# Patient Record
Sex: FEMALE | Race: WHITE | NOT HISPANIC OR LATINO | Employment: FULL TIME | URBAN - METROPOLITAN AREA
[De-identification: names, ages, dates, MRNs, and addresses within clinical notes are randomized per-mention and may not be internally consistent; named-entity substitution may affect disease eponyms.]

---

## 2023-09-05 ENCOUNTER — PATIENT OUTREACH (OUTPATIENT)
Dept: ONCOLOGY | Facility: CLINIC | Age: 53
End: 2023-09-05
Payer: COMMERCIAL

## 2023-09-05 ENCOUNTER — TRANSCRIBE ORDERS (OUTPATIENT)
Dept: OTHER | Age: 53
End: 2023-09-05

## 2023-09-05 DIAGNOSIS — C18.9 COLON CANCER (H): Primary | ICD-10-CM

## 2023-09-05 NOTE — PROGRESS NOTES
Oncology referral received from the pt, seeking 2nd opinion for colon cancer.      HX: 8/9/23 colonoscopy at , + high grade dysplasia, no invasive ca on specimen (also confirmed via path consult at [place]), 8/10 CT CAP possible mesenteric LN met, MRI Abd negative liver mets, s/p colectomy 8/24/23 at , + invasive sigmoid adenocarcinoma, pT3 pN1b. Pt is seeing Dr Hernadez 9/15, pt seeks 2nd op [facility].    Will offer next available GI Med Onc for 2nd opinion per pt preference.      Guy Chavira RN  Oncology Nurse Navigator  [facility]  [phone]

## 2023-09-21 NOTE — TELEPHONE ENCOUNTER
RECORDS STATUS - ALL OTHER DIAGNOSIS      Action September 20, 2023 11:17 PM    Action Taken - Called  to push images to  PACS.   - HP Report is positive, slides requested.   [facility] [employer] Tracking #: [id number]     Imaging Received  September 22, 2023 10:01 AM    Action: Images from  received and resolved to PACS.     RECORDS RECEIVED FROM:    DATE RECEIVED:    NOTES STATUS DETAILS   OFFICE NOTE from referring provider External 9/5/23: Self-Referred   OFFICE NOTE from surgeon -  9/13/23: Dr. Rafal Tinoco   DISCHARGE SUMMARY from hospital Ce-  8/24/23: [facility]   OPERATIVE REPORT -  8/24/23: COLECTOMY   8/9/23: Colonoscopy    MEDICATION LIST CE-     LABS  [facility] [employer] Tracking #: [id number]   PATHOLOGY REPORTS Report in [facility]   (slides Requested) 8/24/23: [id number] (positive)  8/9/23: [id number] (negative)   ANYTHING RELATED TO DIAGNOSIS CE-     IMAGING (NEED IMAGES & REPORT)     CT SCANS (Img req from ) 8/10/23: CT Chest Abd Pel   MRI (Img req from ) 8/15/23: MR Abd

## 2023-09-24 ENCOUNTER — HEALTH MAINTENANCE LETTER (OUTPATIENT)
Age: 53
End: 2023-09-24

## 2023-09-26 ENCOUNTER — ONCOLOGY VISIT (OUTPATIENT)
Dept: ONCOLOGY | Facility: CLINIC | Age: 53
End: 2023-09-26
Attending: INTERNAL MEDICINE
Payer: COMMERCIAL

## 2023-09-26 ENCOUNTER — PRE VISIT (OUTPATIENT)
Dept: ONCOLOGY | Facility: CLINIC | Age: 53
End: 2023-09-26
Payer: COMMERCIAL

## 2023-09-26 ENCOUNTER — LAB (OUTPATIENT)
Dept: LAB | Facility: CLINIC | Age: 53
End: 2023-09-26
Payer: COMMERCIAL

## 2023-09-26 VITALS
RESPIRATION RATE: 16 BRPM | OXYGEN SATURATION: 99 % | BODY MASS INDEX: 26.52 KG/M2 | TEMPERATURE: 97.6 F | SYSTOLIC BLOOD PRESSURE: 120 MMHG | HEART RATE: 72 BPM | HEIGHT: 66 IN | WEIGHT: 165 LBS | DIASTOLIC BLOOD PRESSURE: 76 MMHG

## 2023-09-26 DIAGNOSIS — D50.0 IRON DEFICIENCY ANEMIA DUE TO CHRONIC BLOOD LOSS: ICD-10-CM

## 2023-09-26 DIAGNOSIS — C18.7 MALIGNANT NEOPLASM OF SIGMOID COLON (H): ICD-10-CM

## 2023-09-26 DIAGNOSIS — D50.0 IRON DEFICIENCY ANEMIA DUE TO CHRONIC BLOOD LOSS: Primary | ICD-10-CM

## 2023-09-26 LAB
BASOPHILS # BLD AUTO: 0.1 10E3/UL (ref 0–0.2)
BASOPHILS NFR BLD AUTO: 1 %
CEA SERPL-MCNC: <0.6 NG/ML
EOSINOPHIL # BLD AUTO: 0.3 10E3/UL (ref 0–0.7)
EOSINOPHIL NFR BLD AUTO: 3 %
ERYTHROCYTE [DISTWIDTH] IN BLOOD BY AUTOMATED COUNT: 18.1 % (ref 10–15)
FERRITIN SERPL-MCNC: 6 NG/ML (ref 11–328)
HCT VFR BLD AUTO: 27.2 % (ref 35–47)
HGB BLD-MCNC: 8.1 G/DL (ref 11.7–15.7)
IMM GRANULOCYTES # BLD: 0 10E3/UL
IMM GRANULOCYTES NFR BLD: 1 %
IRON BINDING CAPACITY (ROCHE): 418 UG/DL (ref 240–430)
IRON SATN MFR SERPL: 5 % (ref 15–46)
IRON SERPL-MCNC: 21 UG/DL (ref 37–145)
LYMPHOCYTES # BLD AUTO: 2.3 10E3/UL (ref 0.8–5.3)
LYMPHOCYTES NFR BLD AUTO: 27 %
MCH RBC QN AUTO: 20.5 PG (ref 26.5–33)
MCHC RBC AUTO-ENTMCNC: 29.8 G/DL (ref 31.5–36.5)
MCV RBC AUTO: 69 FL (ref 78–100)
MONOCYTES # BLD AUTO: 0.6 10E3/UL (ref 0–1.3)
MONOCYTES NFR BLD AUTO: 7 %
NEUTROPHILS # BLD AUTO: 5.5 10E3/UL (ref 1.6–8.3)
NEUTROPHILS NFR BLD AUTO: 61 %
NRBC # BLD AUTO: 0 10E3/UL
NRBC BLD AUTO-RTO: 0 /100
PLATELET # BLD AUTO: 365 10E3/UL (ref 150–450)
RBC # BLD AUTO: 3.96 10E6/UL (ref 3.8–5.2)
WBC # BLD AUTO: 8.8 10E3/UL (ref 4–11)

## 2023-09-26 PROCEDURE — 82728 ASSAY OF FERRITIN: CPT

## 2023-09-26 PROCEDURE — 99205 OFFICE O/P NEW HI 60 MIN: CPT | Performed by: INTERNAL MEDICINE

## 2023-09-26 PROCEDURE — 83550 IRON BINDING TEST: CPT

## 2023-09-26 PROCEDURE — 36415 COLL VENOUS BLD VENIPUNCTURE: CPT

## 2023-09-26 PROCEDURE — 82378 CARCINOEMBRYONIC ANTIGEN: CPT

## 2023-09-26 PROCEDURE — 85025 COMPLETE CBC W/AUTO DIFF WBC: CPT

## 2023-09-26 PROCEDURE — 99213 OFFICE O/P EST LOW 20 MIN: CPT | Performed by: INTERNAL MEDICINE

## 2023-09-26 RX ORDER — EPINEPHRINE 1 MG/ML
0.3 INJECTION, SOLUTION, CONCENTRATE INTRAVENOUS EVERY 5 MIN PRN
Status: CANCELLED | OUTPATIENT
Start: 2023-09-28

## 2023-09-26 RX ORDER — ALBUTEROL SULFATE 90 UG/1
1-2 AEROSOL, METERED RESPIRATORY (INHALATION)
Status: CANCELLED
Start: 2023-09-28

## 2023-09-26 RX ORDER — HEPARIN SODIUM (PORCINE) LOCK FLUSH IV SOLN 100 UNIT/ML 100 UNIT/ML
5 SOLUTION INTRAVENOUS
Status: CANCELLED | OUTPATIENT
Start: 2023-09-28

## 2023-09-26 RX ORDER — DIPHENHYDRAMINE HYDROCHLORIDE 50 MG/ML
50 INJECTION INTRAMUSCULAR; INTRAVENOUS
Status: CANCELLED
Start: 2023-09-28

## 2023-09-26 RX ORDER — METHYLPREDNISOLONE SODIUM SUCCINATE 125 MG/2ML
125 INJECTION, POWDER, LYOPHILIZED, FOR SOLUTION INTRAMUSCULAR; INTRAVENOUS
Status: CANCELLED
Start: 2023-09-28

## 2023-09-26 RX ORDER — HEPARIN SODIUM,PORCINE 10 UNIT/ML
5-20 VIAL (ML) INTRAVENOUS DAILY PRN
Status: CANCELLED | OUTPATIENT
Start: 2023-09-28

## 2023-09-26 RX ORDER — ALBUTEROL SULFATE 0.83 MG/ML
2.5 SOLUTION RESPIRATORY (INHALATION)
Status: CANCELLED | OUTPATIENT
Start: 2023-09-28

## 2023-09-26 RX ORDER — MEPERIDINE HYDROCHLORIDE 25 MG/ML
25 INJECTION INTRAMUSCULAR; INTRAVENOUS; SUBCUTANEOUS EVERY 30 MIN PRN
Status: CANCELLED | OUTPATIENT
Start: 2023-09-28

## 2023-09-26 ASSESSMENT — PAIN SCALES - GENERAL: PAINLEVEL: NO PAIN (0)

## 2023-09-26 NOTE — PROGRESS NOTES
[facility] Hematology and Oncology Consult Note    Patient: Shaylee Jackson  MRN: [id number]  Date of Service: 09/26/2023      Reason for Visit    Chief Complaint   Patient presents with    Oncology Clinic Visit     Colon cancer - New consult         Assessment/Plan    Problem List Items Addressed This Visit          Hematologic    Iron deficiency anemia due to chronic blood loss - Primary    Relevant Orders    CBC with platelets and differential (Completed)    CEA (Completed)    Iron and iron binding capacity (Completed)    Ferritin (Completed)     Other Visit Diagnoses       Malignant neoplasm of sigmoid colon (H)        Relevant Orders    CBC with platelets and differential (Completed)    CEA (Completed)    Iron and iron binding capacity (Completed)    Ferritin (Completed)    Adult General Surg Referral          Resected stage III sigmoid colon cancer  pT3, pN1b, cM0  MMR proteins intact  No high risk features except for tumor deposit (1)  Reviewed her history in detail including colonoscopy reports, biopsy report and surgical path reports in detail.  I personally reviewed this report sent.  Results independently.    Screening colonoscopy picked up ulcerative, polypoid obstructing mass in the sigmoid colon.  Initial biopsy came back showing only high-grade dysplasia without any invasive carcinoma.  But clinically it looked invasive malignancy.  CT scan showed no evidence of any distant mets but she had intramesenteric lymph node enlargement consistent with calin metastatic disease.  Also had a couple of liver lesions which were evaluated by MRI and were found to be simple cysts.  Underwent sigmoidectomy.  Surgical path showing a 6.3 cm, moderate differentiated adenocarcinoma without any LVI or perineural invasion.  Margins negative.  No perforation.  Out of 64 lymph nodes removed,  3 were positive.  Had one tumor deposit.  Final pathological staging is pT3, pN1b.  As mentioned above no high risk features  except for tumor deposit which counts as an calin involvement if she did not have grossly positive nodes.  However her calin staging will not change.  For all practical purposes this is considered as low risk stage III.  Definitely will need adjuvant therapy.  Discussed various options.  CapeOx versus FOLFOX.  We also discussed duration of therapy.  Based on IDEA analysis, 3 months of CapeOx has been shown to be noninferior to 6 months of CapeOx.  However for FOLFOX the noninferiority of 3 months versus 6 months has not been established but the absolute benefit of doing 6 months of FOLFOX especially in low risk disease appear to be pretty small.    Somatic mutation is to proceed with 4 cycles of CAPOX.  Reviewed potential side effects and complications associated with capecitabine and oxaliplatin.  She will need a port placement.  Capecitabine dose will be 1000 mg per metered squared twice daily, 14 days on 7 days off.  She is agreeable to the plan and wants to proceed with treatment.  She is about 5 weeks out from surgery.  We will need to start treatment within the next couple of weeks.    She was also significantly iron deficient with low MCV and low hemoglobin.  We will recheck labs today.  If she is found to be significantly iron deficient I would treat her with IV iron (Venofer 300 mg x 3 at least 2 days apart)    ECOG Performance    0 - Independent    Problem List    Patient Active Problem List   Diagnosis    Iron deficiency anemia due to chronic blood loss     ______________________________________________________________________________    Staging History     Cancer Staging   No matching staging information was found for the patient.      History of presenting illness:  Shaylee Jackson is a 53-year-old female with a new diagnosis of stage III colon cancer s/p sigmoid resection who is here to discuss adjuvant systemic therapy for the same.    Screening colonoscopy on 8/9/2023 showed a fungating polypoid  ulcerated obstructing mass in the sigmoid colon located at 30 cm from the anal verge.  Biopsies were taken.  CEA was elevated at 6.4.  Biopsy came back showing high-grade dysplasia without obvious evidence of invasive cancer.  MMR proteins were intact.  CT chest abdomen pelvis done on 8/10/2023 showed circumferential thickening of the sigmoid colon wall reflecting known possible malignancy.  There were multiple conspicuous inferior mesenteric lymph nodes concerning for metastatic disease.  There were 2 indeterminate hepatic lesions measuring up to 1.1 cm.  This was evaluated with an MRI and was deemed to be simple cysts.  No evidence of any metastatic disease.  She underwent laparoscopic sigmoidectomy on 8/24/2023.  Surgical path has come back showing a 6.3 cm moderately differentiated adenocarcinoma of the sigmoid colon which invades muscularis propria into the pericolonic tissue.  No evidence of any tumor perforation.  No evidence of LVI.  No perineural invasion.  All margins were negative.  64 lymph nodes were removed autofused 3 were positive for metastatic disease.  There was 1 tumor deposit present.  Final pathological staging was pT3, pN1b.  MMR proteins intact.  She is here to discuss adjuvant therapy.    Has recovered well from surgery.    No significant past medical history.      Past History    No past medical history on file. No family history on file.   No past surgical history on file. Social History     Socioeconomic History    Marital status: Single     Spouse name: Not on file    Number of children: Not on file    Years of education: Not on file    Highest education level: Not on file   Occupational History    Not on file   Tobacco Use    Smoking status: Never    Smokeless tobacco: Never   Substance and Sexual Activity    Alcohol use: Not on file    Drug use: Not on file    Sexual activity: Not on file   Other Topics Concern    Not on file   Social History Narrative    Not on file     Social  Determinants of Health     Financial Resource Strain: Not on file   Food Insecurity: Not on file   Transportation Needs: Not on file   Physical Activity: Not on file   Stress: Not on file   Social Connections: Not on file   Interpersonal Safety: Not on file   Housing Stability: Not on file        Allergies    Allergies   Allergen Reactions    Codeine Nausea and Vomiting    Other Food Allergy      Gluten    Oxycodone-Acetaminophen Nausea       Review of Systems    Pertinent items are noted in HPI.      Physical Exam        9/26/2023     1:03 PM   Oncology Vitals   Height 168 cm   Weight 74.844 kg   BSA (m2) 1.87 m2   /76   Pulse 72   Temp 97.6  F (36.4  C)   Temp src Oral   SpO2 99 %   Pain Score 0 (None)       General: alert and cooperative  HEENT: Head: Normal, normocephalic, atraumatic.  Eye: Normal external eye, conjunctiva, lids cornea, BHASKAR.  Chest: Clear to auscultation bilaterally  Cardiac: S1, S2 normal, regular rate and rhythm  Abdomen: abdomen is soft without significant tenderness, masses, organomegaly or guarding.  Surgical scars are healing well  Extremities: atraumatic, no peripheral edema  Skin: No rashes or bruises  CNS: Alert and oriented x3, neurologic exam grossly normal.  Lymphatics: No significant palpable peripheral adenopathy      Lab Results    Recent Results (from the past 168 hour(s))   Ferritin   Result Value Ref Range    Ferritin 6 (L) 11 - 328 ng/mL   Iron and iron binding capacity   Result Value Ref Range    Iron 21 (L) 37 - 145 ug/dL    Iron Binding Capacity 418 240 - 430 ug/dL    Iron Sat Index 5 (L) 15 - 46 %   CEA   Result Value Ref Range    CEA <0.6 ng/mL   CBC with platelets and differential   Result Value Ref Range    WBC Count 8.8 4.0 - 11.0 10e3/uL    RBC Count 3.96 3.80 - 5.20 10e6/uL    Hemoglobin 8.1 (L) 11.7 - 15.7 g/dL    Hematocrit 27.2 (L) 35.0 - 47.0 %    MCV 69 (L) 78 - 100 fL    MCH 20.5 (L) 26.5 - 33.0 pg    MCHC 29.8 (L) 31.5 - 36.5 g/dL    RDW 18.1 (H) 10.0 -  15.0 %    Platelet Count 365 150 - 450 10e3/uL    % Neutrophils 61 %    % Lymphocytes 27 %    % Monocytes 7 %    % Eosinophils 3 %    % Basophils 1 %    % Immature Granulocytes 1 %    NRBCs per 100 WBC 0 <1 /100    Absolute Neutrophils 5.5 1.6 - 8.3 10e3/uL    Absolute Lymphocytes 2.3 0.8 - 5.3 10e3/uL    Absolute Monocytes 0.6 0.0 - 1.3 10e3/uL    Absolute Eosinophils 0.3 0.0 - 0.7 10e3/uL    Absolute Basophils 0.1 0.0 - 0.2 10e3/uL    Absolute Immature Granulocytes 0.0 <=0.4 10e3/uL    Absolute NRBCs 0.0 10e3/uL       Imaging Results    No results found.    A total of 60 minutes was spent today on this visit including face to face conversation with the patient, EMR review (labs, imaging studies, pathology reports and outside records), counseling and care co-ordination and documentation.    Signed by: Tuan Cohn MD

## 2023-09-26 NOTE — PROGRESS NOTES
"Oncology Rooming Note    September 26, 2023 1:02 PM   Shaylee Jackson is a 53 year old female who presents for:    Chief Complaint   Patient presents with    Oncology Clinic Visit     Colon cancer - New consult     Initial Vitals: /76 (BP Location: Right arm, Patient Position: Sitting, Cuff Size: Adult Regular)   Pulse 72   Temp 97.6  F (36.4  C) (Oral)   Resp 16   Ht 1.676 m (5' 6\")   Wt 74.8 kg (165 lb)   SpO2 99%   BMI 26.63 kg/m   Estimated body mass index is 26.63 kg/m  as calculated from the following:    Height as of this encounter: 1.676 m (5' 6\").    Weight as of this encounter: 74.8 kg (165 lb). Body surface area is 1.87 meters squared.  No Pain (0) Comment: Data Unavailable   No LMP recorded.  Allergies reviewed: Yes  Medications reviewed: Yes    Medications: Medication refills not needed today.  Pharmacy name entered into [facility]: [facility] #[id number] [place] [id number] [facility]  [address]    Clinical concerns:  New consult      Veronica Villatoro CMA              "

## 2023-09-26 NOTE — LETTER
9/26/2023         RE: Shaylee Jackson  [address] [place] [address]        Dear Colleague,    Thank you for referring your patient, Shaylee Jackson, to the [facility] [place]. Please see a copy of my visit note below.    [facility] Hematology and Oncology Consult Note    Patient: Shaylee Jackson  MRN: [id number]  Date of Service: 09/26/2023      Reason for Visit    Chief Complaint   Patient presents with     Oncology Clinic Visit     Colon cancer - New consult         Assessment/Plan    Problem List Items Addressed This Visit          Hematologic    Iron deficiency anemia due to chronic blood loss - Primary    Relevant Orders    CBC with platelets and differential (Completed)    CEA (Completed)    Iron and iron binding capacity (Completed)    Ferritin (Completed)     Other Visit Diagnoses       Malignant neoplasm of sigmoid colon (H)        Relevant Orders    CBC with platelets and differential (Completed)    CEA (Completed)    Iron and iron binding capacity (Completed)    Ferritin (Completed)    Adult General Surg Referral          Resected stage III sigmoid colon cancer  pT3, pN1b, cM0  MMR proteins intact  No high risk features except for tumor deposit (1)  Reviewed her history in detail including colonoscopy reports, biopsy report and surgical path reports in detail.  I personally reviewed this report sent.  Results independently.    Screening colonoscopy picked up ulcerative, polypoid obstructing mass in the sigmoid colon.  Initial biopsy came back showing only high-grade dysplasia without any invasive carcinoma.  But clinically it looked invasive malignancy.  CT scan showed no evidence of any distant mets but she had intramesenteric lymph node enlargement consistent with calin metastatic disease.  Also had a couple of liver lesions which were evaluated by MRI and were found to be simple cysts.  Underwent sigmoidectomy.  Surgical path showing a 6.3 cm,  moderate differentiated adenocarcinoma without any LVI or perineural invasion.  Margins negative.  No perforation.  Out of 64 lymph nodes removed,  3 were positive.  Had one tumor deposit.  Final pathological staging is pT3, pN1b.  As mentioned above no high risk features except for tumor deposit which counts as an calin involvement if she did not have grossly positive nodes.  However her calin staging will not change.  For all practical purposes this is considered as low risk stage III.  Definitely will need adjuvant therapy.  Discussed various options.  CapeOx versus FOLFOX.  We also discussed duration of therapy.  Based on IDEA analysis, 3 months of CapeOx has been shown to be noninferior to 6 months of CapeOx.  However for FOLFOX the noninferiority of 3 months versus 6 months has not been established but the absolute benefit of doing 6 months of FOLFOX especially in low risk disease appear to be pretty small.    Somatic mutation is to proceed with 4 cycles of CAPOX.  Reviewed potential side effects and complications associated with capecitabine and oxaliplatin.  She will need a port placement.  Capecitabine dose will be 1000 mg per metered squared twice daily, 14 days on 7 days off.  She is agreeable to the plan and wants to proceed with treatment.  She is about 5 weeks out from surgery.  We will need to start treatment within the next couple of weeks.    She was also significantly iron deficient with low MCV and low hemoglobin.  We will recheck labs today.  If she is found to be significantly iron deficient I would treat her with IV iron (Venofer 300 mg x 3 at least 2 days apart)    ECOG Performance    0 - Independent    Problem List    Patient Active Problem List   Diagnosis     Iron deficiency anemia due to chronic blood loss     ______________________________________________________________________________    Staging History     Cancer Staging   No matching staging information was found for the  patient.      History of presenting illness:  Shaylee Jackson is a 53-year-old female with a new diagnosis of stage III colon cancer s/p sigmoid resection who is here to discuss adjuvant systemic therapy for the same.    Screening colonoscopy on 8/9/2023 showed a fungating polypoid ulcerated obstructing mass in the sigmoid colon located at 30 cm from the anal verge.  Biopsies were taken.  CEA was elevated at 6.4.  Biopsy came back showing high-grade dysplasia without obvious evidence of invasive cancer.  MMR proteins were intact.  CT chest abdomen pelvis done on 8/10/2023 showed circumferential thickening of the sigmoid colon wall reflecting known possible malignancy.  There were multiple conspicuous inferior mesenteric lymph nodes concerning for metastatic disease.  There were 2 indeterminate hepatic lesions measuring up to 1.1 cm.  This was evaluated with an MRI and was deemed to be simple cysts.  No evidence of any metastatic disease.  She underwent laparoscopic sigmoidectomy on 8/24/2023.  Surgical path has come back showing a 6.3 cm moderately differentiated adenocarcinoma of the sigmoid colon which invades muscularis propria into the pericolonic tissue.  No evidence of any tumor perforation.  No evidence of LVI.  No perineural invasion.  All margins were negative.  64 lymph nodes were removed autofused 3 were positive for metastatic disease.  There was 1 tumor deposit present.  Final pathological staging was pT3, pN1b.  MMR proteins intact.  She is here to discuss adjuvant therapy.    Has recovered well from surgery.    No significant past medical history.      Past History    No past medical history on file. No family history on file.   No past surgical history on file. Social History     Socioeconomic History     Marital status: Single     Spouse name: Not on file     Number of children: Not on file     Years of education: Not on file     Highest education level: Not on file   Occupational History     Not  on file   Tobacco Use     Smoking status: Never     Smokeless tobacco: Never   Substance and Sexual Activity     Alcohol use: Not on file     Drug use: Not on file     Sexual activity: Not on file   Other Topics Concern     Not on file   Social History Narrative     Not on file     Social Determinants of Health     Financial Resource Strain: Not on file   Food Insecurity: Not on file   Transportation Needs: Not on file   Physical Activity: Not on file   Stress: Not on file   Social Connections: Not on file   Interpersonal Safety: Not on file   Housing Stability: Not on file        Allergies    Allergies   Allergen Reactions     Codeine Nausea and Vomiting     Other Food Allergy      Gluten     Oxycodone-Acetaminophen Nausea       Review of Systems    Pertinent items are noted in HPI.      Physical Exam        9/26/2023     1:03 PM   Oncology Vitals   Height 168 cm   Weight 74.844 kg   BSA (m2) 1.87 m2   /76   Pulse 72   Temp 97.6  F (36.4  C)   Temp src Oral   SpO2 99 %   Pain Score 0 (None)       General: alert and cooperative  HEENT: Head: Normal, normocephalic, atraumatic.  Eye: Normal external eye, conjunctiva, lids cornea, BHASKAR.  Chest: Clear to auscultation bilaterally  Cardiac: S1, S2 normal, regular rate and rhythm  Abdomen: abdomen is soft without significant tenderness, masses, organomegaly or guarding.  Surgical scars are healing well  Extremities: atraumatic, no peripheral edema  Skin: No rashes or bruises  CNS: Alert and oriented x3, neurologic exam grossly normal.  Lymphatics: No significant palpable peripheral adenopathy      Lab Results    Recent Results (from the past 168 hour(s))   Ferritin   Result Value Ref Range    Ferritin 6 (L) 11 - 328 ng/mL   Iron and iron binding capacity   Result Value Ref Range    Iron 21 (L) 37 - 145 ug/dL    Iron Binding Capacity 418 240 - 430 ug/dL    Iron Sat Index 5 (L) 15 - 46 %   CEA   Result Value Ref Range    CEA <0.6 ng/mL   CBC with platelets and  "differential   Result Value Ref Range    WBC Count 8.8 4.0 - 11.0 10e3/uL    RBC Count 3.96 3.80 - 5.20 10e6/uL    Hemoglobin 8.1 (L) 11.7 - 15.7 g/dL    Hematocrit 27.2 (L) 35.0 - 47.0 %    MCV 69 (L) 78 - 100 fL    MCH 20.5 (L) 26.5 - 33.0 pg    MCHC 29.8 (L) 31.5 - 36.5 g/dL    RDW 18.1 (H) 10.0 - 15.0 %    Platelet Count 365 150 - 450 10e3/uL    % Neutrophils 61 %    % Lymphocytes 27 %    % Monocytes 7 %    % Eosinophils 3 %    % Basophils 1 %    % Immature Granulocytes 1 %    NRBCs per 100 WBC 0 <1 /100    Absolute Neutrophils 5.5 1.6 - 8.3 10e3/uL    Absolute Lymphocytes 2.3 0.8 - 5.3 10e3/uL    Absolute Monocytes 0.6 0.0 - 1.3 10e3/uL    Absolute Eosinophils 0.3 0.0 - 0.7 10e3/uL    Absolute Basophils 0.1 0.0 - 0.2 10e3/uL    Absolute Immature Granulocytes 0.0 <=0.4 10e3/uL    Absolute NRBCs 0.0 10e3/uL       Imaging Results    No results found.    A total of 60 minutes was spent today on this visit including face to face conversation with the patient, EMR review (labs, imaging studies, pathology reports and outside records), counseling and care co-ordination and documentation.    Signed by: Tuan Cohn MD      Oncology Rooming Note    September 26, 2023 1:02 PM   Shaylee Jackson is a 53 year old female who presents for:    Chief Complaint   Patient presents with     Oncology Clinic Visit     Colon cancer - New consult     Initial Vitals: /76 (BP Location: Right arm, Patient Position: Sitting, Cuff Size: Adult Regular)   Pulse 72   Temp 97.6  F (36.4  C) (Oral)   Resp 16   Ht 1.676 m (5' 6\")   Wt 74.8 kg (165 lb)   SpO2 99%   BMI 26.63 kg/m   Estimated body mass index is 26.63 kg/m  as calculated from the following:    Height as of this encounter: 1.676 m (5' 6\").    Weight as of this encounter: 74.8 kg (165 lb). Body surface area is 1.87 meters squared.  No Pain (0) Comment: Data Unavailable   No LMP recorded.  Allergies reviewed: Yes  Medications reviewed: Yes    Medications: " Medication refills not needed today.  Pharmacy name entered into [facility]: [facility] #[id number] - [facility] [id number] AT [facility]  [address]    Clinical concerns:  New consult      Veronica Villatoro CMA                Again, thank you for allowing me to participate in the care of your patient.        Sincerely,        Tuan Cohn MD

## 2023-09-27 ENCOUNTER — TELEPHONE (OUTPATIENT)
Dept: SURGERY | Facility: CLINIC | Age: 53
End: 2023-09-27
Payer: COMMERCIAL

## 2023-09-27 DIAGNOSIS — C18.7 MALIGNANT NEOPLASM OF SIGMOID COLON (H): Primary | ICD-10-CM

## 2023-09-27 NOTE — TELEPHONE ENCOUNTER
Type of surgery: INSERTION, VASCULAR ACCESS PORT (N/A)   Location of surgery: [place] OR  Date and time of surgery: 10/04/2023  Surgeon: Jamin  Pre-Op Appt Date: pt will schedule with [facility]  Post-Op Appt Date: none   Packet sent out: Yes  Pre-cert/Authorization completed:  No  Date:

## 2023-09-28 ENCOUNTER — TELEPHONE (OUTPATIENT)
Dept: ONCOLOGY | Facility: CLINIC | Age: 53
End: 2023-09-28
Payer: COMMERCIAL

## 2023-09-28 DIAGNOSIS — C18.7 MALIGNANT NEOPLASM OF SIGMOID COLON (H): Primary | ICD-10-CM

## 2023-09-28 NOTE — TELEPHONE ENCOUNTER
PA Initiation    Medication: CAPECITABINE 500 MG PO TABS  Ref.#[id number]  Insurance Company: [employer] [place] - Phone [phone] Fax [phone]  Pharmacy Filling the Rx:    Filling Pharmacy Phone:    Filling Pharmacy Fax:    Start Date: 9/28/2023

## 2023-09-29 ENCOUNTER — TELEPHONE (OUTPATIENT)
Dept: ONCOLOGY | Facility: CLINIC | Age: 53
End: 2023-09-29
Payer: COMMERCIAL

## 2023-09-29 NOTE — TELEPHONE ENCOUNTER
Prior Authorization Approval    Medication: CAPECITABINE 500 MG PO TABS  Authorization Effective Date: 9/28/2023  Authorization Expiration Date: 9/28/2024  Approved Dose/Quantity: 112/21 days  Reference #: [id number]   Insurance Company: [employer] [place] - Phone [phone] Fax [phone]  Expected CoPay: $ 0  CoPay Card Available: No    Financial Assistance Needed: not needed  Which Pharmacy is filling the prescription: [facility] - [place], [place] - [address]  Pharmacy Notified: yes  Patient Notified: yes

## 2023-09-29 NOTE — ORAL ONC MGMT
"Oral Chemotherapy Monitoring Program    Primary Oncologist: Dr. Cohn  Primary Oncology Clinic: [facility], [place], operated by [facility]  Cancer Diagnosis: Colon Cancer    Drug: capecitabine  Start Date: 10/5/23  Expected duration of therapy: 4 cycles    Drug Interaction Assessment: There were no significant drug interactions identified upon review of medication list with chemotherapy agents.    Lab Monitoring Plan  Per treatment plan    Subjective/Objective:  Shaylee Jackson is a 53 year old female contacted by phone for an initial visit for oral chemotherapy education.         9/29/2023    11:00 AM   ORAL CHEMOTHERAPY   Assessment Type New Teach;Initial Work up   Diagnosis Code Colon Cancer   Providers Dr. Cohn   Clinic Name/Location [facility]   Drug Name Xeloda (capecitabine)   Current Schedule BID   Cycle Details 2 weeks on, 1 week off   Any new drug interactions? No   Is the dose as ordered appropriate for the patient? Yes       Vitals:  BP:   BP Readings from Last 1 Encounters:   09/26/23 120/76     Wt Readings from Last 1 Encounters:   09/26/23 74.8 kg (165 lb)     Estimated body surface area is 1.87 meters squared as calculated from the following:    Height as of 9/26/23: 1.676 m (5' 6\").    Weight as of 9/26/23: 74.8 kg (165 lb).    Labs:  No results found for NA within last 30 days.     No results found for K within last 30 days.     No results found for CA within last 30 days.     No results found for Mag within last 30 days.     No results found for Phos within last 30 days.     No results found for ALBUMIN within last 30 days.     No results found for BUN within last 30 days.     No results found for CR within last 30 days.       No results found for AST within last 30 days.     No results found for ALT within last 30 days.     No results found for BILITOTAL within last 30 days.       _  Result Component Current Result Ref Range   WBC Count 8.8 (9/26/2023) 4.0 - 11.0 10e3/uL     _  Result Component Current Result Ref Range "   Hemoglobin 8.1 (L) (9/26/2023) 11.7 - 15.7 g/dL     _  Result Component Current Result Ref Range   Platelet Count 365 (9/26/2023) 150 - 450 10e3/uL     No results found for ANC within last 30 days.         Assessment:  Patient is appropriate to start therapy.    Plan:  Basic chemotherapy teaching was reviewed with the patient including indication, start date of therapy, dose, administration, adverse effects, missed doses, food and drug interactions, monitoring, side effect management, office contact information, and safe handling. Written materials were provided and all questions answered.    Follow-Up:  We will follow-up about 1 week after she starts treatment for an initial follow-up.     Ranjan Kruse, PharmD  [facility] Pharmacist  [phone]  September 29, 2023

## 2023-10-02 ENCOUNTER — PATIENT OUTREACH (OUTPATIENT)
Dept: ONCOLOGY | Facility: CLINIC | Age: 53
End: 2023-10-02
Payer: COMMERCIAL

## 2023-10-02 DIAGNOSIS — C18.7 MALIGNANT NEOPLASM OF SIGMOID COLON (H): Primary | ICD-10-CM

## 2023-10-02 RX ORDER — CAPECITABINE 500 MG/1
1000 TABLET, FILM COATED ORAL 2 TIMES DAILY
Qty: 112 TABLET | Refills: 0 | Status: SHIPPED | OUTPATIENT
Start: 2023-10-04 | End: 2023-10-25

## 2023-10-02 RX ORDER — CAPECITABINE 500 MG/1
1000 TABLET, FILM COATED ORAL 2 TIMES DAILY
Qty: 56 TABLET | Refills: 0 | Status: SHIPPED | OUTPATIENT
Start: 2023-10-04 | End: 2023-10-02

## 2023-10-03 ENCOUNTER — ANESTHESIA EVENT (OUTPATIENT)
Dept: SURGERY | Facility: CLINIC | Age: 53
End: 2023-10-03
Payer: COMMERCIAL

## 2023-10-03 RX ORDER — LIDOCAINE/PRILOCAINE 2.5 %-2.5%
CREAM (GRAM) TOPICAL
Qty: 30 G | Refills: 1 | Status: SHIPPED | OUTPATIENT
Start: 2023-10-03

## 2023-10-03 NOTE — PROGRESS NOTES
[facility]: Cancer Care Initial Note                                    Discussion with Patient:                                                      Chemo Teach completed             Assessment:                                                      Initial  Current living arrangement:: I live alone;Other (Daughter moving home)  Informal Support system:: Family  Bed or wheelchair confined:: No  Mobility Status: Independent  Transportation means:: Regular car  Medication adherence problem (GOAL):: No  Knowledgeable about how to use meds:: Yes  Medication side effects suspected:: Yes  Referrals Placed: None  Advanced Care Plans/Directives on file:: No  Advanced Care Plan/Directive Status: Not Applicable  Patient Reported Pain?: Yes, is currently well-managed    Plan of Care Education Review:   Assessment completed with:: Patient    Current living arrangement  I live alone;Other (Daughter moving home)    Plan of Care Education   Yearly learning assessment completed?: Yes (see Education tab)  Diagnosis:: Colon Cancer  Does patient understand diagnosis?: Yes  Tx plan/regimen:: Xelox  Does patient understand treatment plan/regimen?: Yes  Preparing for treatment:: Reviewed treatment preparation information with patient (vascular access, day of chemo, visitor policy, what to bring, etc.)  Vascular access:: Port  Side effect education:: Diarrhea/Constipation;Fatigue;Infection;Lab value monitoring (anemia, neutropenia, thrombocytopenia);Mylosuppression;Mouth sores;Nausea/Vomiting;Neuropathy;Sexual health  Transportation means:: Regular car  Safety/self care at home reviewed with patient:: Yes  Informal Support system:: Family  Coping - concerns/fears reviewed with patient:: Yes  Plan of Care:: MD follow-up appointment;Treatment schedule;TYSON follow-up appointment  When to call provider:: Bleeding;Temperature >100.4F;Increased shortness of breath;Uncontrolled diarrhea/constipation;New/worsening pain;Uncontrolled  nausea/vomiting;Shaking chills  Reasons for deferring treatment reviewed with patient:: Yes    Evaluation of Learning  Patient Education Provided: Yes  Readiness:: Acceptance  Method:: Booklet/Handout;Explanation  Response:: Verbalizes understanding           Intervention/Education provided during outreach:                                                       C1D1 Xelox to start 10/5    Patient to follow up as scheduled at next appt    Signature:  Rupa Newton RN

## 2023-10-04 ENCOUNTER — APPOINTMENT (OUTPATIENT)
Dept: GENERAL RADIOLOGY | Facility: CLINIC | Age: 53
End: 2023-10-04
Attending: SURGERY
Payer: COMMERCIAL

## 2023-10-04 ENCOUNTER — HOSPITAL ENCOUNTER (OUTPATIENT)
Facility: CLINIC | Age: 53
Discharge: HOME | End: 2023-10-04
Attending: SURGERY | Admitting: SURGERY
Payer: COMMERCIAL

## 2023-10-04 ENCOUNTER — ANESTHESIA (OUTPATIENT)
Dept: SURGERY | Facility: CLINIC | Age: 53
End: 2023-10-04
Payer: COMMERCIAL

## 2023-10-04 VITALS
BODY MASS INDEX: 26.52 KG/M2 | HEIGHT: 66 IN | DIASTOLIC BLOOD PRESSURE: 60 MMHG | OXYGEN SATURATION: 100 % | HEART RATE: 80 BPM | WEIGHT: 165 LBS | SYSTOLIC BLOOD PRESSURE: 109 MMHG | RESPIRATION RATE: 16 BRPM | TEMPERATURE: 97.7 F

## 2023-10-04 PROCEDURE — 250N000013 HC RX MED GY IP 250 OP 250 PS 637: Performed by: NURSE ANESTHETIST, CERTIFIED REGISTERED

## 2023-10-04 PROCEDURE — 250N000009 HC RX 250: Performed by: SURGERY

## 2023-10-04 PROCEDURE — 250N000011 HC RX IP 250 OP 636: Performed by: SURGERY

## 2023-10-04 PROCEDURE — 250N000009 HC RX 250: Performed by: NURSE ANESTHETIST, CERTIFIED REGISTERED

## 2023-10-04 PROCEDURE — 370N000017 HC ANESTHESIA TECHNICAL FEE, PER MIN: Performed by: SURGERY

## 2023-10-04 PROCEDURE — 250N000011 HC RX IP 250 OP 636: Performed by: NURSE ANESTHETIST, CERTIFIED REGISTERED

## 2023-10-04 PROCEDURE — 360N000082 HC SURGERY LEVEL 2 W/ FLUORO, PER MIN: Performed by: SURGERY

## 2023-10-04 PROCEDURE — 999N000180 XR SURGERY CARM FLUORO LESS THAN 5 MIN: Mod: TC

## 2023-10-04 PROCEDURE — 272N000001 HC OR GENERAL SUPPLY STERILE: Performed by: SURGERY

## 2023-10-04 PROCEDURE — 710N000012 HC RECOVERY PHASE 2, PER MINUTE: Performed by: SURGERY

## 2023-10-04 PROCEDURE — C1788 PORT, INDWELLING, IMP: HCPCS | Performed by: SURGERY

## 2023-10-04 PROCEDURE — 258N000003 HC RX IP 258 OP 636: Performed by: NURSE ANESTHETIST, CERTIFIED REGISTERED

## 2023-10-04 PROCEDURE — 999N000065 XR CHEST PORT 1 VIEW

## 2023-10-04 PROCEDURE — 271N000001 HC OR GENERAL SUPPLY NON-STERILE: Performed by: SURGERY

## 2023-10-04 PROCEDURE — 36561 INSERT TUNNELED CV CATH: CPT | Performed by: SURGERY

## 2023-10-04 PROCEDURE — 999N000141 HC STATISTIC PRE-PROCEDURE NURSING ASSESSMENT: Performed by: SURGERY

## 2023-10-04 PROCEDURE — 77001 FLUOROGUIDE FOR VEIN DEVICE: CPT | Mod: 26 | Performed by: SURGERY

## 2023-10-04 DEVICE — CATH PORT MRI POWERPORT 8FR SL 1808000: Type: IMPLANTABLE DEVICE | Site: NECK | Status: FUNCTIONAL

## 2023-10-04 RX ORDER — LIDOCAINE HYDROCHLORIDE 20 MG/ML
INJECTION, SOLUTION INFILTRATION; PERINEURAL PRN
Status: DISCONTINUED | OUTPATIENT
Start: 2023-10-04 | End: 2023-10-04

## 2023-10-04 RX ORDER — HEPARIN SODIUM 1000 [USP'U]/ML
INJECTION, SOLUTION INTRAVENOUS; SUBCUTANEOUS PRN
Status: DISCONTINUED | OUTPATIENT
Start: 2023-10-04 | End: 2023-10-04 | Stop reason: HOSPADM

## 2023-10-04 RX ORDER — CEFAZOLIN SODIUM/WATER 2 G/20 ML
2 SYRINGE (ML) INTRAVENOUS SEE ADMIN INSTRUCTIONS
Status: DISCONTINUED | OUTPATIENT
Start: 2023-10-04 | End: 2023-10-04 | Stop reason: HOSPADM

## 2023-10-04 RX ORDER — CEFAZOLIN SODIUM/WATER 2 G/20 ML
2 SYRINGE (ML) INTRAVENOUS
Status: COMPLETED | OUTPATIENT
Start: 2023-10-04 | End: 2023-10-04

## 2023-10-04 RX ORDER — ONDANSETRON 2 MG/ML
4 INJECTION INTRAMUSCULAR; INTRAVENOUS EVERY 30 MIN PRN
Status: DISCONTINUED | OUTPATIENT
Start: 2023-10-04 | End: 2023-10-04 | Stop reason: HOSPADM

## 2023-10-04 RX ORDER — LIDOCAINE HYDROCHLORIDE AND EPINEPHRINE 10; 10 MG/ML; UG/ML
INJECTION, SOLUTION INFILTRATION; PERINEURAL PRN
Status: DISCONTINUED | OUTPATIENT
Start: 2023-10-04 | End: 2023-10-04 | Stop reason: HOSPADM

## 2023-10-04 RX ORDER — ONDANSETRON 4 MG/1
4 TABLET, ORALLY DISINTEGRATING ORAL EVERY 30 MIN PRN
Status: DISCONTINUED | OUTPATIENT
Start: 2023-10-04 | End: 2023-10-04 | Stop reason: HOSPADM

## 2023-10-04 RX ORDER — PROPOFOL 10 MG/ML
INJECTION, EMULSION INTRAVENOUS PRN
Status: DISCONTINUED | OUTPATIENT
Start: 2023-10-04 | End: 2023-10-04

## 2023-10-04 RX ORDER — OXYCODONE HYDROCHLORIDE 5 MG/1
5 TABLET ORAL
Status: DISCONTINUED | OUTPATIENT
Start: 2023-10-04 | End: 2023-10-04 | Stop reason: HOSPADM

## 2023-10-04 RX ORDER — ACETAMINOPHEN 325 MG/1
975 TABLET ORAL ONCE
Status: COMPLETED | OUTPATIENT
Start: 2023-10-04 | End: 2023-10-04

## 2023-10-04 RX ORDER — SODIUM CHLORIDE, SODIUM LACTATE, POTASSIUM CHLORIDE, CALCIUM CHLORIDE 600; 310; 30; 20 MG/100ML; MG/100ML; MG/100ML; MG/100ML
INJECTION, SOLUTION INTRAVENOUS CONTINUOUS
Status: DISCONTINUED | OUTPATIENT
Start: 2023-10-04 | End: 2023-10-04 | Stop reason: HOSPADM

## 2023-10-04 RX ORDER — BUPIVACAINE HYDROCHLORIDE 5 MG/ML
INJECTION, SOLUTION PERINEURAL PRN
Status: DISCONTINUED | OUTPATIENT
Start: 2023-10-04 | End: 2023-10-04 | Stop reason: HOSPADM

## 2023-10-04 RX ORDER — LIDOCAINE 40 MG/G
CREAM TOPICAL
Status: DISCONTINUED | OUTPATIENT
Start: 2023-10-04 | End: 2023-10-04 | Stop reason: HOSPADM

## 2023-10-04 RX ORDER — PROPOFOL 10 MG/ML
INJECTION, EMULSION INTRAVENOUS CONTINUOUS PRN
Status: DISCONTINUED | OUTPATIENT
Start: 2023-10-04 | End: 2023-10-04

## 2023-10-04 RX ORDER — KETAMINE HYDROCHLORIDE 10 MG/ML
INJECTION INTRAMUSCULAR; INTRAVENOUS PRN
Status: DISCONTINUED | OUTPATIENT
Start: 2023-10-04 | End: 2023-10-04

## 2023-10-04 RX ADMIN — PROPOFOL 100 MCG/KG/MIN: 10 INJECTION, EMULSION INTRAVENOUS at 14:08

## 2023-10-04 RX ADMIN — ACETAMINOPHEN 975 MG: 325 TABLET, FILM COATED ORAL at 13:45

## 2023-10-04 RX ADMIN — KETAMINE HYDROCHLORIDE 10 MG: 10 INJECTION INTRAMUSCULAR; INTRAVENOUS at 14:17

## 2023-10-04 RX ADMIN — KETAMINE HYDROCHLORIDE 10 MG: 10 INJECTION INTRAMUSCULAR; INTRAVENOUS at 14:21

## 2023-10-04 RX ADMIN — Medication 2 G: at 13:56

## 2023-10-04 RX ADMIN — KETAMINE HYDROCHLORIDE 10 MG: 10 INJECTION INTRAMUSCULAR; INTRAVENOUS at 14:24

## 2023-10-04 RX ADMIN — KETAMINE HYDROCHLORIDE 10 MG: 10 INJECTION INTRAMUSCULAR; INTRAVENOUS at 14:27

## 2023-10-04 RX ADMIN — SODIUM CHLORIDE, POTASSIUM CHLORIDE, SODIUM LACTATE AND CALCIUM CHLORIDE: 600; 310; 30; 20 INJECTION, SOLUTION INTRAVENOUS at 13:42

## 2023-10-04 RX ADMIN — LIDOCAINE HYDROCHLORIDE 0.1 ML: 10 INJECTION, SOLUTION EPIDURAL; INFILTRATION; INTRACAUDAL; PERINEURAL at 13:43

## 2023-10-04 RX ADMIN — KETAMINE HYDROCHLORIDE 10 MG: 10 INJECTION INTRAMUSCULAR; INTRAVENOUS at 14:13

## 2023-10-04 RX ADMIN — PROPOFOL 100 MG: 10 INJECTION, EMULSION INTRAVENOUS at 14:08

## 2023-10-04 RX ADMIN — PROPOFOL 50 MG: 10 INJECTION, EMULSION INTRAVENOUS at 14:32

## 2023-10-04 RX ADMIN — LIDOCAINE HYDROCHLORIDE 100 MG: 20 INJECTION, SOLUTION INFILTRATION; PERINEURAL at 14:08

## 2023-10-04 ASSESSMENT — ACTIVITIES OF DAILY LIVING (ADL)
ADLS_ACUITY_SCORE: 35
ADLS_ACUITY_SCORE: 33

## 2023-10-04 NOTE — ANESTHESIA POSTPROCEDURE EVALUATION
Patient: Shaylee Jackson    Procedure: Procedure(s):  INSERTION, VASCULAR ACCESS PORT       Anesthesia Type:  General    Note:  Disposition: Outpatient   Postop Pain Control: Uneventful            Sign Out: Well controlled pain   PONV: No   Neuro/Psych: Uneventful            Sign Out: Acceptable/Baseline neuro status   Airway/Respiratory: Uneventful            Sign Out: Acceptable/Baseline resp. status   CV/Hemodynamics: Uneventful            Sign Out: Acceptable CV status; No obvious hypovolemia; No obvious fluid overload   Other NRE: NONE   DID A NON-ROUTINE EVENT OCCUR? No           Last vitals:  Vitals Value Taken Time   /55 10/04/23 1509   Temp 36.5  C (97.7  F) 10/04/23 1509   Pulse 79 10/04/23 1509   Resp 16 10/04/23 1509   SpO2 100 % 10/04/23 1513   Vitals shown include unvalidated device data.    Electronically Signed By: Cachorro Gutierrez CRNA, APRN CRNA  October 4, 2023  3:14 PM

## 2023-10-04 NOTE — DISCHARGE INSTRUCTIONS
Discharge for Port Placement  Routine Postoperative Care  You may shower now   Extra Strength Tylenol and over the counter Motrin (if you can take these medications) should be sufficient for your painis sufficient.   Eat a balanced diet and try to maintain good nutrition. Also drink plenty of fluids. Some of the pain medications can cause constipation, and drinking plenty of water will help prevent this.  If you do get constipated, you may need a stool softener or laxative. We or your pharmacist can help you with this.  You may resume light activity as you feel up to it. Avoid any heavy lifting (heavier than 10 pounds), or strenuous activity (including exercise) for one week.    Incision Sites  After 24 hours, you may shower.  The glue will follow-off on its own  2)            After 24-48 hours you may shower, cleaning wounds with soapy water. After showering, pat the wound dry with a clean towel. Do not apply any creams or ointments                   to the incision.   3)            You should call with any signs that the wound may be infected. Signs of infection include: the skin around the wound becoming redder, swollen or feeling hot; the                 wound has green or yellow drainage or smells bad, you have fever over 101oF or increasing pain.       Follow-up  You do not require routine follow-up for your procedure.  You must have your port accessed at least once a month to be flushed.  If there are any issues with your port or you are finished with use and would like it removed, please call the office to schedule an appointment.    Remember that healing from surgery, even if that surgery seems small, takes time. If you have any questions about your procedure, your follow-up instructions or the plan of care, do not hesitate to call.  For urgent calls weekends and holidays or prior to 8 am or after 4:30 pm weekdays, call the [facility] Surgery Office ([phone]) will be forwarded to a nurse  and if necessary Dr. Neville or the on call general surgeon.  Nausea and Vomiting: Nausea and vomiting can occur any time after receiving anesthesia. If you experience nausea and vomiting we encourage you to move to a clear liquid diet and advance your diet as tolerated. If nausea and vomiting do not improve within 12 hours please call the surgeon or present to the Emergency department.     Break-through Bleeding: If your experience bleeding from your surgical site apply pressure and additional dressing per nurse instruction. For simple problems such as a saturated dressing, you may need to reinforce the dressing with more gauze and tape and put slight pressure on the site. If bleeding does not subside contact the surgeon or present to the Emergency Department.    Post-op Infection: If you develop a fever of 100.4 or greater, have pus like drainage, redness, swelling or severe pain at the surgical site not alleviated with pain medications; please contact the surgeon or present to the Emergency Department.

## 2023-10-04 NOTE — ANESTHESIA CARE TRANSFER NOTE
Patient: Shaylee Jackson    Procedure: Procedure(s):  INSERTION, VASCULAR ACCESS PORT       Diagnosis: Malignant neoplasm of sigmoid colon (H) [C18.7]  Diagnosis Additional Information: No value filed.    Anesthesia Type:   General     Note:    Oropharynx: spontaneously breathing and oropharynx clear of all foreign objects  Level of Consciousness: awake  Oxygen Supplementation: nasal cannula  Level of Supplemental Oxygen (L/min / FiO2): 2  Independent Airway: airway patency satisfactory and stable  Dentition: dentition unchanged  Vital Signs Stable: post-procedure vital signs reviewed and stable  Report to RN Given: handoff report given  Patient transferred to: Phase II    Handoff Report: Identifed the Patient, Identified the Reponsible Provider, Reviewed the pertinent medical history, Discussed the surgical course, Reviewed Intra-OP anesthesia mangement and issues during anesthesia, Set expectations for post-procedure period and Allowed opportunity for questions and acknowledgement of understanding    Vitals:  Vitals Value Taken Time   BP     Temp     Pulse     Resp     SpO2         Electronically Signed By: Cachorro Gutierrez CRNA, APRN CRNA  October 4, 2023  3:05 PM

## 2023-10-04 NOTE — ANESTHESIA PREPROCEDURE EVALUATION
Anesthesia Pre-Procedure Evaluation    Patient: Shaylee Jackson   MRN: [id number] : 1970        Procedure : Procedure(s):  INSERTION, VASCULAR ACCESS PORT          No past medical history on file.   History reviewed. No pertinent surgical history.   Allergies   Allergen Reactions    Codeine Nausea and Vomiting    Other Food Allergy      Gluten    Oxycodone-Acetaminophen Nausea      Social History     Tobacco Use    Smoking status: Never    Smokeless tobacco: Never   Substance Use Topics    Alcohol use: Not on file      Wt Readings from Last 1 Encounters:   10/04/23 74.8 kg (165 lb)        Anesthesia Evaluation   Pt has had prior anesthetic. Type: General and MAC.    History of anesthetic complications       ROS/MED HX  ENT/Pulmonary:  - neg pulmonary ROS     Neurologic:  - neg neurologic ROS     Cardiovascular:  - neg cardiovascular ROS     METS/Exercise Tolerance: >4 METS    Hematologic:     (+)      anemia,          Musculoskeletal: Comment: Lumbago      GI/Hepatic:  - neg GI/hepatic ROS     Renal/Genitourinary:  - neg Renal ROS     Endo:     (+)          thyroid problem, hypothyroidism,           Psychiatric/Substance Use:  - neg psychiatric ROS     Infectious Disease:  - neg infectious disease ROS     Malignancy:   (+) Malignancy, History of GI.GI CA  Active status post.      Other:  - neg other ROS          Physical Exam    Airway  airway exam normal      Mallampati: II   TM distance: > 3 FB   Neck ROM: full   Mouth opening: > 3 cm    Respiratory Devices and Support         Dental       (+) Modest Abnormalities - crowns, retainers, 1 or 2 missing teeth      Cardiovascular   cardiovascular exam normal       Rhythm and rate: regular and normal     Pulmonary   pulmonary exam normal        breath sounds clear to auscultation           OUTSIDE LABS:  CBC:   Lab Results   Component Value Date    WBC 8.8 2023    HGB 8.1 (L) 2023    HCT 27.2 (L) 2023     2023     BMP: No results  found for: NA, POTASSIUM, CHLORIDE, CO2, BUN, CR, GLC  COAGS: No results found for: PTT, INR, FIBR  POC: No results found for: BGM, HCG, HCGS  HEPATIC: No results found for: ALBUMIN, PROTTOTAL, ALT, AST, GGT, ALKPHOS, BILITOTAL, BILIDIRECT, ARDEN  OTHER: No results found for: PH, LACT, A1C, SLIM, PHOS, MAG, LIPASE, AMYLASE, TSH, T4, T3, CRP, SED    Anesthesia Plan    ASA Status:  2    NPO Status:  NPO Appropriate    Anesthesia Type: General.     - Airway: Native airway   Induction: Intravenous, Propofol.   Maintenance: TIVA.        Consents    Anesthesia Plan(s) and associated risks, benefits, and realistic alternatives discussed. Questions answered and patient/representative(s) expressed understanding.     - Discussed: Risks, Benefits and Alternatives for BOTH SEDATION and the PROCEDURE were discussed     - Discussed with:  Patient      - Extended Intubation/Ventilatory Support Discussed: No.      - Patient is DNR/DNI Status: No     Use of blood products discussed: No .     Postoperative Care    Pain management: Oral pain medications, IV analgesics.   PONV prophylaxis: Ondansetron (or other 5HT-3), Background Propofol Infusion     Comments:                Cachorro Gutierrez CRNA, APRN NAOMI

## 2023-10-04 NOTE — OP NOTE
"Procedure Date: 10/04/23     PREOPERATIVE DIAGNOSIS: 1. Colon cancer  POSTOPERATIVE DIAGNOSIS: Same    PROCEDURE:    1. Placement of Right internal jugular vein Infusion port (8 Arabic Bard Power-Port)  2. Fluoroscopic guidance and confirmation of port placement.  3. Ultrasound guided venous access    SURGEON: Lewis Neville DO    ASSISTANT: Mikey Gross PA-C (needed for retraction, suction, assistance with closure)    ESTIMATED BLOOD LOSS: 3 mL    INDICATIONS:Ms. Shaylee Jackson is a 53 year old white female who presents with a history of colon cancer. The patient is in need of an infusion port for chemotherapy access, and a discussion was held with the patient regarding indications, risks, benefits, and alternatives (including, but not limited to, risks of bleeding, infection, pneumothorax, need for revision, thrombosis, stenosis, port malposition/malfunction, cardiac arrhythmia, and the rare risk of gas embolism). She understood the information given, questions were addressed, and she wishes to proceed.     DESCRIPTION OF PROCEDURE: The patient was brought to the operating room and placed supine on the operating room table. Sedative agents were administered by the anesthesia service. The bilateral chest and neck were then prepped and draped in the usual sterile fashion. The patient was placed in Trendelenburg position. The right internal jugular vein was assessed, compressible and appeared suitable for placement.  The right internal jugular vein was percutaneously accessed after \"a single pass of the introducer needle under direct visualization on ultrasound. A guidewire was advanced under direct fluoroscopic guidance, and the trajectory was confirmed toward the superior vena cava/heart. Additional local anesthesia was used, and a cutdown was created on the right chest and the incision was deepened to create an appropriate pocket to allow the port reservoir to comfortably reside. An introducer sheath was " then passed over the guidewire without difficulty, under direct fluoroscopic guidance, and the dilator and guidewire removed. The infusion port catheter was then passed until the tip the catheter was in the region of the cavo-atrial junction via fluoroscopy . The sheath was torn away in the standard fashion. The catheter was tunneled to the reservoir site, manicured to an appropriate length, and secured to the infusion port reservoir and the locking collar affixed. The port was easily flushed with a heparin solution, after assuring ease of aspiration. The infusion port itself was then secured to the tissue in the base of the pocket using interrupted 0-Ethibond suture. The final catheter position was again assessed under fluoroscopy and appeared satisfactory, with no abnormal trajectory or kinks noted, and with the tip of the catheter again noted at the cavo-atrial junction. The pocket was irrigated. Hemostasis was assured. An instrument count was conducted, and included laparotomy pads, needles and sponges, and was found to be correct. The subcutaneous tissue was closed with interrupted 3-0 Vicryl. Skin edges were re-approximated using 4-0 Monocryl, and the wound was cleansed and dried prior to application of sterile glue    The patient tolerated the procedure well. She was then transferred to the recovery room in stable condition, was allowed to recover and was seated upright for a post-procedure chest x-ray.     Lewis Neville DO on 10/4/2023 at 3:02 PM

## 2023-10-04 NOTE — H&P
"Surgical Consultation/History and Physical  [facility]    Shaylee is seen in consultation for port    Chief Complaint:  Rectal cancer    HPI:  Shaylee Jackson presents in consultation today for evaluation of infusion port placement. She has a history of rectal cancer. The patient is  left hand  dominant, has never had central venous access, pneumothorax, lung surgery, history of dialysis or renal failure, and denies repetitive movements with the upper extremity (such as pitching, chopping wood, bowling, or hunting).      Patient Active Problem List   Diagnosis    Iron deficiency anemia due to chronic blood loss    Malignant neoplasm of sigmoid colon (H)     No past medical history on file.    History reviewed. No pertinent surgical history.    History reviewed. No pertinent family history.    Social History     Tobacco Use    Smoking status: Never    Smokeless tobacco: Never   Substance Use Topics    Alcohol use: Not on file      History   Drug Use Not on file     No current outpatient medications on file.     Allergies   Allergen Reactions    Codeine Nausea and Vomiting    Other Food Allergy      Gluten    Oxycodone-Acetaminophen Nausea     Review of Systems:   10 point ROS otherwise negative    Physical Exam:  /63 (BP Location: Right arm)   Pulse 71   Temp 98.2  F (36.8  C) (Oral)   Ht 1.676 m (5' 6\")   Wt 74.8 kg (165 lb)   SpO2 100%   BMI 26.63 kg/m      Constitutional- No acute distress, well nourished, non-toxic  Eyes: Anicteric, no injection.  PERRL  ENT:  Normocephalic, atraumatic, Nose midline, moist mucus membranes  Neck - supple, no LAD  Respiratory- Clear to auscultation bilaterally, good inspiratory effort  Cardiovascular - Heart RRR, no lift's, thrills, murmurs, rubs, or gallop.  No peripheral edema.  No clubbing.  Neuro - No focal neuro deficits, Alert and oriented x 3  Psych: Appropriate mood and affect  Musculoskeletal: Normal gait, symmetric strength.  FROM upper " and lower extremities.  Skin: Warm, Dry    LABS:     Lab Results   Component Value Date    WBC 8.8 09/26/2023    HGB 8.1 (L) 09/26/2023    HCT 27.2 (L) 09/26/2023    MCV 69 (L) 09/26/2023     09/26/2023      No results found for: INR, PROTIME       INFORMED CONSENT:     A discussion of the indications, risks, benefits and alternatives to surgery was held with the patient, and the risks discussed include beeding, infection, thrombosis, stenosis, port malfunction or malposition, air embolism, pneumothorax, hemothorax, or cardiac arrythmia . The patient understood the information given, questions addressed, and she wishes to proceed. She understands the need for maintenance of the infusion port at least once a month while in place.    ASSESSMENT AND PLAN:     Ms. Shaylee Jackson is in need of an infusion port for rectal cancer, and is being expedited to surgery to facilitate care. She  understood the information given, and wishes to proceed accordingly.- and is being accordingly scheduled.    Lewis Neville,  on 10/4/2023 at 1:48 PM

## 2023-10-05 ENCOUNTER — INFUSION THERAPY VISIT (OUTPATIENT)
Dept: INFUSION THERAPY | Facility: CLINIC | Age: 53
End: 2023-10-05
Attending: INTERNAL MEDICINE
Payer: COMMERCIAL

## 2023-10-05 ENCOUNTER — TELEPHONE (OUTPATIENT)
Dept: ONCOLOGY | Facility: HOSPITAL | Age: 53
End: 2023-10-05

## 2023-10-05 VITALS
SYSTOLIC BLOOD PRESSURE: 129 MMHG | DIASTOLIC BLOOD PRESSURE: 73 MMHG | WEIGHT: 163.6 LBS | TEMPERATURE: 98.1 F | BODY MASS INDEX: 26.41 KG/M2 | HEART RATE: 66 BPM

## 2023-10-05 DIAGNOSIS — C18.7 MALIGNANT NEOPLASM OF SIGMOID COLON (H): Primary | ICD-10-CM

## 2023-10-05 DIAGNOSIS — D50.0 IRON DEFICIENCY ANEMIA DUE TO CHRONIC BLOOD LOSS: Primary | ICD-10-CM

## 2023-10-05 DIAGNOSIS — C18.7 MALIGNANT NEOPLASM OF SIGMOID COLON (H): ICD-10-CM

## 2023-10-05 LAB
ALBUMIN SERPL BCG-MCNC: 4.1 G/DL (ref 3.5–5.2)
ALP SERPL-CCNC: 47 U/L (ref 35–104)
ALT SERPL W P-5'-P-CCNC: 7 U/L (ref 0–50)
ANION GAP SERPL CALCULATED.3IONS-SCNC: 10 MMOL/L (ref 7–15)
AST SERPL W P-5'-P-CCNC: 13 U/L (ref 0–45)
BASO+EOS+MONOS # BLD AUTO: ABNORMAL 10*3/UL
BASO+EOS+MONOS NFR BLD AUTO: ABNORMAL %
BASOPHILS # BLD AUTO: 0.1 10E3/UL (ref 0–0.2)
BASOPHILS NFR BLD AUTO: 1 %
BILIRUB SERPL-MCNC: 0.3 MG/DL
BUN SERPL-MCNC: 8.7 MG/DL (ref 6–20)
CALCIUM SERPL-MCNC: 9.5 MG/DL (ref 8.6–10)
CHLORIDE SERPL-SCNC: 104 MMOL/L (ref 98–107)
CREAT SERPL-MCNC: 0.78 MG/DL (ref 0.51–0.95)
DEPRECATED HCO3 PLAS-SCNC: 26 MMOL/L (ref 22–29)
EGFRCR SERPLBLD CKD-EPI 2021: >90 ML/MIN/1.73M2
EOSINOPHIL # BLD AUTO: 0.2 10E3/UL (ref 0–0.7)
EOSINOPHIL NFR BLD AUTO: 2 %
ERYTHROCYTE [DISTWIDTH] IN BLOOD BY AUTOMATED COUNT: 18.6 % (ref 10–15)
GLUCOSE SERPL-MCNC: 99 MG/DL (ref 70–99)
HCT VFR BLD AUTO: 25.5 % (ref 35–47)
HGB BLD-MCNC: 7.6 G/DL (ref 11.7–15.7)
IMM GRANULOCYTES # BLD: 0 10E3/UL
IMM GRANULOCYTES NFR BLD: 0 %
LYMPHOCYTES # BLD AUTO: 2.1 10E3/UL (ref 0.8–5.3)
LYMPHOCYTES NFR BLD AUTO: 23 %
MCH RBC QN AUTO: 20.7 PG (ref 26.5–33)
MCHC RBC AUTO-ENTMCNC: 29.8 G/DL (ref 31.5–36.5)
MCV RBC AUTO: 70 FL (ref 78–100)
MONOCYTES # BLD AUTO: 0.6 10E3/UL (ref 0–1.3)
MONOCYTES NFR BLD AUTO: 7 %
NEUTROPHILS # BLD AUTO: 6.1 10E3/UL (ref 1.6–8.3)
NEUTROPHILS NFR BLD AUTO: 67 %
NRBC # BLD AUTO: 0 10E3/UL
NRBC BLD AUTO-RTO: 0 /100
PLATELET # BLD AUTO: 427 10E3/UL (ref 150–450)
POTASSIUM SERPL-SCNC: 3.8 MMOL/L (ref 3.4–5.3)
PROT SERPL-MCNC: 7.3 G/DL (ref 6.4–8.3)
RBC # BLD AUTO: 3.67 10E6/UL (ref 3.8–5.2)
SODIUM SERPL-SCNC: 140 MMOL/L (ref 135–145)
WBC # BLD AUTO: 9 10E3/UL (ref 4–11)

## 2023-10-05 PROCEDURE — 96415 CHEMO IV INFUSION ADDL HR: CPT

## 2023-10-05 PROCEDURE — 96367 TX/PROPH/DG ADDL SEQ IV INF: CPT

## 2023-10-05 PROCEDURE — 36591 DRAW BLOOD OFF VENOUS DEVICE: CPT | Performed by: INTERNAL MEDICINE

## 2023-10-05 PROCEDURE — 96375 TX/PRO/DX INJ NEW DRUG ADDON: CPT

## 2023-10-05 PROCEDURE — 96413 CHEMO IV INFUSION 1 HR: CPT

## 2023-10-05 PROCEDURE — 85025 COMPLETE CBC W/AUTO DIFF WBC: CPT | Performed by: INTERNAL MEDICINE

## 2023-10-05 PROCEDURE — 80053 COMPREHEN METABOLIC PANEL: CPT | Performed by: INTERNAL MEDICINE

## 2023-10-05 PROCEDURE — 250N000011 HC RX IP 250 OP 636: Performed by: INTERNAL MEDICINE

## 2023-10-05 PROCEDURE — 258N000003 HC RX IP 258 OP 636: Performed by: INTERNAL MEDICINE

## 2023-10-05 RX ORDER — DIPHENHYDRAMINE HYDROCHLORIDE 50 MG/ML
50 INJECTION INTRAMUSCULAR; INTRAVENOUS
Status: CANCELLED
Start: 2023-10-07

## 2023-10-05 RX ORDER — METHYLPREDNISOLONE SODIUM SUCCINATE 125 MG/2ML
125 INJECTION, POWDER, LYOPHILIZED, FOR SOLUTION INTRAMUSCULAR; INTRAVENOUS
Status: CANCELLED
Start: 2023-10-05

## 2023-10-05 RX ORDER — HEPARIN SODIUM,PORCINE 10 UNIT/ML
5-20 VIAL (ML) INTRAVENOUS DAILY PRN
Status: CANCELLED | OUTPATIENT
Start: 2023-10-05

## 2023-10-05 RX ORDER — HEPARIN SODIUM (PORCINE) LOCK FLUSH IV SOLN 100 UNIT/ML 100 UNIT/ML
5 SOLUTION INTRAVENOUS
Status: CANCELLED | OUTPATIENT
Start: 2023-10-05

## 2023-10-05 RX ORDER — LORAZEPAM 2 MG/ML
0.5 INJECTION INTRAMUSCULAR EVERY 4 HOURS PRN
Status: CANCELLED | OUTPATIENT
Start: 2023-10-05

## 2023-10-05 RX ORDER — MEPERIDINE HYDROCHLORIDE 25 MG/ML
25 INJECTION INTRAMUSCULAR; INTRAVENOUS; SUBCUTANEOUS EVERY 30 MIN PRN
Status: CANCELLED | OUTPATIENT
Start: 2023-10-05

## 2023-10-05 RX ORDER — EPINEPHRINE 1 MG/ML
0.3 INJECTION, SOLUTION, CONCENTRATE INTRAVENOUS EVERY 5 MIN PRN
Status: CANCELLED | OUTPATIENT
Start: 2023-10-07

## 2023-10-05 RX ORDER — ALBUTEROL SULFATE 0.83 MG/ML
2.5 SOLUTION RESPIRATORY (INHALATION)
Status: CANCELLED | OUTPATIENT
Start: 2023-10-05

## 2023-10-05 RX ORDER — ONDANSETRON 8 MG/1
8 TABLET, FILM COATED ORAL EVERY 8 HOURS PRN
Qty: 30 TABLET | Refills: 2 | Status: SHIPPED | OUTPATIENT
Start: 2023-10-05 | End: 2023-10-27

## 2023-10-05 RX ORDER — METHYLPREDNISOLONE SODIUM SUCCINATE 125 MG/2ML
125 INJECTION, POWDER, LYOPHILIZED, FOR SOLUTION INTRAMUSCULAR; INTRAVENOUS
Status: CANCELLED
Start: 2023-10-07

## 2023-10-05 RX ORDER — ONDANSETRON 2 MG/ML
8 INJECTION INTRAMUSCULAR; INTRAVENOUS ONCE
Status: CANCELLED | OUTPATIENT
Start: 2023-10-05

## 2023-10-05 RX ORDER — ALBUTEROL SULFATE 90 UG/1
1-2 AEROSOL, METERED RESPIRATORY (INHALATION)
Status: CANCELLED
Start: 2023-10-07

## 2023-10-05 RX ORDER — PROCHLORPERAZINE MALEATE 10 MG
10 TABLET ORAL EVERY 6 HOURS PRN
Qty: 30 TABLET | Refills: 2 | Status: SHIPPED | OUTPATIENT
Start: 2023-10-05 | End: 2024-02-22

## 2023-10-05 RX ORDER — ALBUTEROL SULFATE 90 UG/1
1-2 AEROSOL, METERED RESPIRATORY (INHALATION)
Status: CANCELLED
Start: 2023-10-05

## 2023-10-05 RX ORDER — ONDANSETRON 2 MG/ML
8 INJECTION INTRAMUSCULAR; INTRAVENOUS ONCE
Status: COMPLETED | OUTPATIENT
Start: 2023-10-05 | End: 2023-10-05

## 2023-10-05 RX ORDER — DIPHENHYDRAMINE HYDROCHLORIDE 50 MG/ML
50 INJECTION INTRAMUSCULAR; INTRAVENOUS
Status: CANCELLED
Start: 2023-10-05

## 2023-10-05 RX ORDER — ALBUTEROL SULFATE 0.83 MG/ML
2.5 SOLUTION RESPIRATORY (INHALATION)
Status: CANCELLED | OUTPATIENT
Start: 2023-10-07

## 2023-10-05 RX ORDER — EPINEPHRINE 1 MG/ML
0.3 INJECTION, SOLUTION, CONCENTRATE INTRAVENOUS EVERY 5 MIN PRN
Status: CANCELLED | OUTPATIENT
Start: 2023-10-05

## 2023-10-05 RX ORDER — DEXAMETHASONE 4 MG/1
8 TABLET ORAL DAILY
Qty: 4 TABLET | Refills: 2 | Status: SHIPPED | OUTPATIENT
Start: 2023-10-05 | End: 2023-12-01

## 2023-10-05 RX ORDER — HEPARIN SODIUM (PORCINE) LOCK FLUSH IV SOLN 100 UNIT/ML 100 UNIT/ML
5 SOLUTION INTRAVENOUS
Status: DISCONTINUED | OUTPATIENT
Start: 2023-10-05 | End: 2023-10-05 | Stop reason: HOSPADM

## 2023-10-05 RX ORDER — MEPERIDINE HYDROCHLORIDE 25 MG/ML
25 INJECTION INTRAMUSCULAR; INTRAVENOUS; SUBCUTANEOUS EVERY 30 MIN PRN
Status: CANCELLED | OUTPATIENT
Start: 2023-10-07

## 2023-10-05 RX ORDER — HEPARIN SODIUM,PORCINE 10 UNIT/ML
5-20 VIAL (ML) INTRAVENOUS DAILY PRN
Status: CANCELLED | OUTPATIENT
Start: 2023-10-07

## 2023-10-05 RX ORDER — HEPARIN SODIUM (PORCINE) LOCK FLUSH IV SOLN 100 UNIT/ML 100 UNIT/ML
5 SOLUTION INTRAVENOUS
Status: CANCELLED | OUTPATIENT
Start: 2023-10-07

## 2023-10-05 RX ADMIN — OXALIPLATIN 250 MG: 100 INJECTION, SOLUTION, CONCENTRATE INTRAVENOUS at 11:38

## 2023-10-05 RX ADMIN — IRON SUCROSE 300 MG: 20 INJECTION, SOLUTION INTRAVENOUS at 08:55

## 2023-10-05 RX ADMIN — ONDANSETRON 8 MG: 2 INJECTION INTRAMUSCULAR; INTRAVENOUS at 11:28

## 2023-10-05 RX ADMIN — FAMOTIDINE 20 MG: 10 INJECTION INTRAVENOUS at 11:33

## 2023-10-05 RX ADMIN — Medication 5 ML: at 14:06

## 2023-10-05 RX ADMIN — DEXAMETHASONE SODIUM PHOSPHATE: 10 INJECTION, SOLUTION INTRAMUSCULAR; INTRAVENOUS at 11:01

## 2023-10-05 RX ADMIN — SODIUM CHLORIDE 250 ML: 9 INJECTION, SOLUTION INTRAVENOUS at 08:59

## 2023-10-05 RX ADMIN — DEXTROSE MONOHYDRATE 250 ML: 50 INJECTION, SOLUTION INTRAVENOUS at 11:20

## 2023-10-05 NOTE — PROGRESS NOTES
Infusion Nursing Note:  Shaylee Jackson presents today for C1D1 Venofer, Oxaliplatin/Xeloda.    Patient seen by provider today: No   present during visit today: Not Applicable.    Note: N/A.      Intravenous Access:  Implanted Port.    Treatment Conditions:  Results reviewed, labs MET treatment parameters, ok to proceed with treatment.      Post Infusion Assessment:  Patient tolerated infusion without incident.  Patient observed for 30 minutes post Venofer per protocol.  Blood return noted pre and post infusion.  Site patent and intact, free from redness, edema or discomfort.  No evidence of extravasations.  Access discontinued per protocol.       Discharge Plan:   Patient discharged in stable condition accompanied by: self.  Departure Mode: Ambulatory.      Ashli Waters RN

## 2023-10-05 NOTE — TELEPHONE ENCOUNTER
PA Initiation    Medication: LIDOCAINE-PRILOCAINE 2.5-2.5 % EX CREA  Insurance Company: [employer] [place] - Phone [phone] Fax [phone]  Pharmacy Filling the Rx: [place], [place] - [address]  Start Date: 10/5/2023

## 2023-10-09 ENCOUNTER — PATIENT OUTREACH (OUTPATIENT)
Dept: ONCOLOGY | Facility: CLINIC | Age: 53
End: 2023-10-09
Payer: COMMERCIAL

## 2023-10-09 ENCOUNTER — INFUSION THERAPY VISIT (OUTPATIENT)
Dept: INFUSION THERAPY | Facility: CLINIC | Age: 53
End: 2023-10-09
Attending: INTERNAL MEDICINE
Payer: COMMERCIAL

## 2023-10-09 VITALS — SYSTOLIC BLOOD PRESSURE: 132 MMHG | HEART RATE: 65 BPM | DIASTOLIC BLOOD PRESSURE: 72 MMHG | TEMPERATURE: 98.1 F

## 2023-10-09 DIAGNOSIS — D50.0 IRON DEFICIENCY ANEMIA DUE TO CHRONIC BLOOD LOSS: Primary | ICD-10-CM

## 2023-10-09 PROCEDURE — 258N000003 HC RX IP 258 OP 636: Performed by: INTERNAL MEDICINE

## 2023-10-09 PROCEDURE — 250N000011 HC RX IP 250 OP 636: Mod: JZ | Performed by: INTERNAL MEDICINE

## 2023-10-09 PROCEDURE — 96366 THER/PROPH/DIAG IV INF ADDON: CPT

## 2023-10-09 PROCEDURE — 96365 THER/PROPH/DIAG IV INF INIT: CPT

## 2023-10-09 RX ORDER — HEPARIN SODIUM (PORCINE) LOCK FLUSH IV SOLN 100 UNIT/ML 100 UNIT/ML
5 SOLUTION INTRAVENOUS
Status: DISCONTINUED | OUTPATIENT
Start: 2023-10-09 | End: 2023-10-09 | Stop reason: HOSPADM

## 2023-10-09 RX ORDER — ALBUTEROL SULFATE 0.83 MG/ML
2.5 SOLUTION RESPIRATORY (INHALATION)
Status: CANCELLED | OUTPATIENT
Start: 2023-10-11

## 2023-10-09 RX ORDER — EPINEPHRINE 1 MG/ML
0.3 INJECTION, SOLUTION, CONCENTRATE INTRAVENOUS EVERY 5 MIN PRN
Status: CANCELLED | OUTPATIENT
Start: 2023-10-11

## 2023-10-09 RX ORDER — MEPERIDINE HYDROCHLORIDE 25 MG/ML
25 INJECTION INTRAMUSCULAR; INTRAVENOUS; SUBCUTANEOUS EVERY 30 MIN PRN
Status: CANCELLED | OUTPATIENT
Start: 2023-10-11

## 2023-10-09 RX ORDER — DIPHENHYDRAMINE HYDROCHLORIDE 50 MG/ML
50 INJECTION INTRAMUSCULAR; INTRAVENOUS
Status: CANCELLED
Start: 2023-10-11

## 2023-10-09 RX ORDER — METHYLPREDNISOLONE SODIUM SUCCINATE 125 MG/2ML
125 INJECTION, POWDER, LYOPHILIZED, FOR SOLUTION INTRAMUSCULAR; INTRAVENOUS
Status: CANCELLED
Start: 2023-10-11

## 2023-10-09 RX ORDER — HEPARIN SODIUM (PORCINE) LOCK FLUSH IV SOLN 100 UNIT/ML 100 UNIT/ML
5 SOLUTION INTRAVENOUS
Status: CANCELLED | OUTPATIENT
Start: 2023-10-11

## 2023-10-09 RX ORDER — HEPARIN SODIUM,PORCINE 10 UNIT/ML
5-20 VIAL (ML) INTRAVENOUS DAILY PRN
Status: CANCELLED | OUTPATIENT
Start: 2023-10-11

## 2023-10-09 RX ORDER — ALBUTEROL SULFATE 90 UG/1
1-2 AEROSOL, METERED RESPIRATORY (INHALATION)
Status: CANCELLED
Start: 2023-10-11

## 2023-10-09 RX ADMIN — SODIUM CHLORIDE 250 ML: 9 INJECTION, SOLUTION INTRAVENOUS at 13:41

## 2023-10-09 RX ADMIN — IRON SUCROSE 300 MG: 20 INJECTION, SOLUTION INTRAVENOUS at 13:40

## 2023-10-09 RX ADMIN — Medication 5 ML: at 15:45

## 2023-10-09 NOTE — PROGRESS NOTES
Infusion Nursing Note:  Shaylee Jackson presents today for Venofer 2/3.    Patient seen by provider today: No   present during visit today: Not Applicable.    Note: N/A.    Intravenous Access:  Implanted Port.    Treatment Conditions:  Not Applicable.    Post Infusion Assessment:  Patient tolerated infusion without incident.  Patient observed for 30 minutes post Venofer per protocol.  Site patent and intact, free from redness, edema or discomfort.  No evidence of extravasations.  Access discontinued per protocol.     Discharge Plan:   Patient discharged in stable condition accompanied by: self.  Departure Mode: Ambulatory.    Omari Cardona RN

## 2023-10-09 NOTE — PROGRESS NOTES
[facility]: Cancer Care Short Note                                    Discussion with Patient:                                                      Status check           Assessment:                                                      Assessment completed with:: Patient    Constitutional  Fatigue: Fatigue relieved by rest  Fever: Absent or within normal limits    Respiratory  Cough: Absent or within normal limits  Dyspnea: Absent or within normal limits    Gastrointestinal  Anorexia: Oral intake altered without significant weight loss or malnutrition OR oral nutritional supplements indicated  Nausea: Absent or within normal limits  Vomiting: Absent or within normal limits  Dehydration: Absent or within normal limits  Mucositis Oral: Absent or within normal limits  Constipation: Absent or within normal limits  Diarrhea: Absent or within normal limits    Genitourinary  Patient Reported Genitourinary Symptoms?: No    Integumentary  Rash Maculo-Papular: Absent or within normal limits    Pain  Patient Reported Pain?: No  Pain Score: No Pain (0)  (DVPRS) Pain Rating Score : 0-No pain    Patient Coping  Accepting    Clinic Utilization  Patient Aware of Next Appointment?: Yes    Other Patient Concerns  Other Patient Reported Concerns: No    No assessment indicated    Intervention/Education provided during outreach:                                                         Patient to follow up as scheduled at next appt    Signature:  Rupa Newton RN

## 2023-10-11 ENCOUNTER — INFUSION THERAPY VISIT (OUTPATIENT)
Dept: INFUSION THERAPY | Facility: CLINIC | Age: 53
End: 2023-10-11
Attending: INTERNAL MEDICINE
Payer: COMMERCIAL

## 2023-10-11 VITALS
RESPIRATION RATE: 16 BRPM | DIASTOLIC BLOOD PRESSURE: 55 MMHG | SYSTOLIC BLOOD PRESSURE: 101 MMHG | TEMPERATURE: 98.9 F | HEART RATE: 69 BPM

## 2023-10-11 DIAGNOSIS — C18.7 MALIGNANT NEOPLASM OF SIGMOID COLON (H): Primary | ICD-10-CM

## 2023-10-11 DIAGNOSIS — D50.0 IRON DEFICIENCY ANEMIA DUE TO CHRONIC BLOOD LOSS: Primary | ICD-10-CM

## 2023-10-11 LAB
ALBUMIN SERPL BCG-MCNC: 4.1 G/DL (ref 3.5–5.2)
ALP SERPL-CCNC: 50 U/L (ref 35–104)
ALT SERPL W P-5'-P-CCNC: 11 U/L (ref 0–50)
ANION GAP SERPL CALCULATED.3IONS-SCNC: 7 MMOL/L (ref 7–15)
AST SERPL W P-5'-P-CCNC: 17 U/L (ref 0–45)
BASO+EOS+MONOS # BLD AUTO: ABNORMAL 10*3/UL
BASO+EOS+MONOS NFR BLD AUTO: ABNORMAL %
BASOPHILS # BLD AUTO: 0 10E3/UL (ref 0–0.2)
BASOPHILS NFR BLD AUTO: 0 %
BILIRUB SERPL-MCNC: 0.4 MG/DL
BUN SERPL-MCNC: 8.4 MG/DL (ref 6–20)
CALCIUM SERPL-MCNC: 9 MG/DL (ref 8.6–10)
CHLORIDE SERPL-SCNC: 102 MMOL/L (ref 98–107)
CREAT SERPL-MCNC: 0.75 MG/DL (ref 0.51–0.95)
DEPRECATED HCO3 PLAS-SCNC: 28 MMOL/L (ref 22–29)
EGFRCR SERPLBLD CKD-EPI 2021: >90 ML/MIN/1.73M2
EOSINOPHIL # BLD AUTO: 0.5 10E3/UL (ref 0–0.7)
EOSINOPHIL NFR BLD AUTO: 7 %
ERYTHROCYTE [DISTWIDTH] IN BLOOD BY AUTOMATED COUNT: 18.6 % (ref 10–15)
GLUCOSE SERPL-MCNC: 103 MG/DL (ref 70–99)
HCT VFR BLD AUTO: 27.2 % (ref 35–47)
HGB BLD-MCNC: 8.2 G/DL (ref 11.7–15.7)
IMM GRANULOCYTES # BLD: 0 10E3/UL
IMM GRANULOCYTES NFR BLD: 0 %
LYMPHOCYTES # BLD AUTO: 2.7 10E3/UL (ref 0.8–5.3)
LYMPHOCYTES NFR BLD AUTO: 35 %
MCH RBC QN AUTO: 20.7 PG (ref 26.5–33)
MCHC RBC AUTO-ENTMCNC: 30.1 G/DL (ref 31.5–36.5)
MCV RBC AUTO: 69 FL (ref 78–100)
MONOCYTES # BLD AUTO: 0.3 10E3/UL (ref 0–1.3)
MONOCYTES NFR BLD AUTO: 4 %
NEUTROPHILS # BLD AUTO: 4.2 10E3/UL (ref 1.6–8.3)
NEUTROPHILS NFR BLD AUTO: 54 %
NRBC # BLD AUTO: 0 10E3/UL
NRBC BLD AUTO-RTO: 0 /100
PLATELET # BLD AUTO: 424 10E3/UL (ref 150–450)
POTASSIUM SERPL-SCNC: 3.8 MMOL/L (ref 3.4–5.3)
PROT SERPL-MCNC: 7.2 G/DL (ref 6.4–8.3)
RBC # BLD AUTO: 3.97 10E6/UL (ref 3.8–5.2)
SODIUM SERPL-SCNC: 137 MMOL/L (ref 135–145)
WBC # BLD AUTO: 7.7 10E3/UL (ref 4–11)

## 2023-10-11 PROCEDURE — 96365 THER/PROPH/DIAG IV INF INIT: CPT

## 2023-10-11 PROCEDURE — 80053 COMPREHEN METABOLIC PANEL: CPT | Performed by: INTERNAL MEDICINE

## 2023-10-11 PROCEDURE — 258N000003 HC RX IP 258 OP 636: Performed by: INTERNAL MEDICINE

## 2023-10-11 PROCEDURE — 36591 DRAW BLOOD OFF VENOUS DEVICE: CPT | Performed by: INTERNAL MEDICINE

## 2023-10-11 PROCEDURE — 250N000011 HC RX IP 250 OP 636: Mod: JZ | Performed by: INTERNAL MEDICINE

## 2023-10-11 PROCEDURE — 85025 COMPLETE CBC W/AUTO DIFF WBC: CPT | Performed by: INTERNAL MEDICINE

## 2023-10-11 PROCEDURE — 96366 THER/PROPH/DIAG IV INF ADDON: CPT

## 2023-10-11 RX ORDER — HEPARIN SODIUM,PORCINE 10 UNIT/ML
5-20 VIAL (ML) INTRAVENOUS DAILY PRN
OUTPATIENT
Start: 2023-10-11

## 2023-10-11 RX ORDER — HEPARIN SODIUM (PORCINE) LOCK FLUSH IV SOLN 100 UNIT/ML 100 UNIT/ML
5 SOLUTION INTRAVENOUS
OUTPATIENT
Start: 2023-10-11

## 2023-10-11 RX ORDER — ALBUTEROL SULFATE 0.83 MG/ML
2.5 SOLUTION RESPIRATORY (INHALATION)
OUTPATIENT
Start: 2023-10-11

## 2023-10-11 RX ORDER — MEPERIDINE HYDROCHLORIDE 25 MG/ML
25 INJECTION INTRAMUSCULAR; INTRAVENOUS; SUBCUTANEOUS EVERY 30 MIN PRN
OUTPATIENT
Start: 2023-10-11

## 2023-10-11 RX ORDER — HEPARIN SODIUM (PORCINE) LOCK FLUSH IV SOLN 100 UNIT/ML 100 UNIT/ML
5 SOLUTION INTRAVENOUS
Status: DISCONTINUED | OUTPATIENT
Start: 2023-10-11 | End: 2023-10-11 | Stop reason: HOSPADM

## 2023-10-11 RX ORDER — METHYLPREDNISOLONE SODIUM SUCCINATE 125 MG/2ML
125 INJECTION, POWDER, LYOPHILIZED, FOR SOLUTION INTRAMUSCULAR; INTRAVENOUS
Start: 2023-10-11

## 2023-10-11 RX ORDER — DIPHENHYDRAMINE HYDROCHLORIDE 50 MG/ML
50 INJECTION INTRAMUSCULAR; INTRAVENOUS
Start: 2023-10-11

## 2023-10-11 RX ORDER — EPINEPHRINE 1 MG/ML
0.3 INJECTION, SOLUTION, CONCENTRATE INTRAVENOUS EVERY 5 MIN PRN
OUTPATIENT
Start: 2023-10-11

## 2023-10-11 RX ORDER — ALBUTEROL SULFATE 90 UG/1
1-2 AEROSOL, METERED RESPIRATORY (INHALATION)
Start: 2023-10-11

## 2023-10-11 RX ADMIN — SODIUM CHLORIDE 250 ML: 9 INJECTION, SOLUTION INTRAVENOUS at 13:07

## 2023-10-11 RX ADMIN — IRON SUCROSE 300 MG: 20 INJECTION, SOLUTION INTRAVENOUS at 13:22

## 2023-10-11 RX ADMIN — Medication 5 ML: at 15:19

## 2023-10-11 NOTE — PROGRESS NOTES
Infusion Nursing Note:  Shaylee ADLER Manuel presents today for Venofer 3/3.    Patient seen by provider today: No   present during visit today: Not Applicable.    Note: N/A.      Intravenous Access:  Implanted Port.    Treatment Conditions:  Not Applicable.      Post Infusion Assessment:  Patient tolerated infusion without incident.  Patient observed for 30 minutes post Venofer per protocol.  Blood return noted pre and post infusion.  Site patent and intact, free from redness, edema or discomfort.  No evidence of extravasations.  Access discontinued per protocol.       Discharge Plan:   Patient discharged in stable condition accompanied by: self.  Departure Mode: Ambulatory.      Stacy Goodrich RN

## 2023-10-12 ENCOUNTER — VIRTUAL VISIT (OUTPATIENT)
Dept: ONCOLOGY | Facility: CLINIC | Age: 53
End: 2023-10-12
Attending: INTERNAL MEDICINE
Payer: COMMERCIAL

## 2023-10-12 VITALS — BODY MASS INDEX: 25.71 KG/M2 | WEIGHT: 160 LBS | HEIGHT: 66 IN

## 2023-10-12 DIAGNOSIS — C18.7 MALIGNANT NEOPLASM OF SIGMOID COLON (H): Primary | ICD-10-CM

## 2023-10-12 PROCEDURE — 99214 OFFICE O/P EST MOD 30 MIN: CPT | Mod: 95 | Performed by: INTERNAL MEDICINE

## 2023-10-12 ASSESSMENT — PAIN SCALES - GENERAL: PAINLEVEL: NO PAIN (0)

## 2023-10-12 NOTE — LETTER
10/12/2023         RE: Shaylee Jackson  [address] [place] [address]        Dear Colleague,    Thank you for referring your patient, Shaylee Jackson, to the [facility] CANC[facility] [place]. Please see a copy of my visit note below.    Virtual Visit Details    Type of service:  Video Visit   Video Start time: 2:57 PM  Video stop time: 3:07 PM  Originating Location (pt. Location): Home    Distant Location (provider location):  Off-site  Platform used for Video Visit: [facility] Hematology and Oncology Progress Note    Patient: Shaylee Jackson  MRN: [id number]  10/12/23        Reason for Visit    Chief Complaint   Patient presents with     RECHECK         Problem List Items Addressed This Visit    None        Assessment and Plan  Resected stage III sigmoid colon cancer  pT3, pN1b, cM0  MMR proteins intact  No high risk features except for tumor deposit (1)    Currently on adjuvant therapy with CapeOx.  Intended duration of treatment is 3 months.  Received first infusion on 10/5/2023.  Did well with it.  This is a toxicity check.  She has done fairly well on treatment so far.  Tolerating Xeloda okay.    Labs from yesterday reviewed.  Hemoglobin stable at 8.2.  She also has significant iron deficiency and got IV iron infusions.  She has received 3 doses of Venofer so far.  Total white count was normal at 7.7.  Platelet count of 424.  Serum chemistry was within normal limits.    She has 1 more week of Xeloda to go for this cycle.  No evidence of hand-foot syndrome.  She does have expected oxaliplatin induced cold sensitivity.  Did have some nausea type symptoms.  Plan is to continue Xeloda 2 weeks on 1 week off and oxaliplatin once every 3 weeks for total of 4 cycles.     Cancer Staging   No matching staging information was found for the patient.      ECOG Performance    0 - Independent         Problem List    Patient Active Problem List   Diagnosis     Iron deficiency  anemia due to chronic blood loss     Malignant neoplasm of sigmoid colon (H)        Oncology history  Screening colonoscopy on 8/9/2023 showed a fungating polypoid ulcerated obstructing mass in the sigmoid colon located at 30 cm from the anal verge.  Biopsies were taken.  CEA was elevated at 6.4.  Biopsy came back showing high-grade dysplasia without obvious evidence of invasive cancer.  MMR proteins were intact.  CT chest abdomen pelvis done on 8/10/2023 showed circumferential thickening of the sigmoid colon wall reflecting known possible malignancy.  There were multiple conspicuous inferior mesenteric lymph nodes concerning for metastatic disease.  There were 2 indeterminate hepatic lesions measuring up to 1.1 cm.  This was evaluated with an MRI and was deemed to be simple cysts.  No evidence of any metastatic disease.  She underwent laparoscopic sigmoidectomy on 8/24/2023.  Surgical path has come back showing a 6.3 cm moderately differentiated adenocarcinoma of the sigmoid colon which invades muscularis propria into the pericolonic tissue.  No evidence of any tumor perforation.  No evidence of LVI.  No perineural invasion.  All margins were negative.  64 lymph nodes were removed autofused 3 were positive for metastatic disease.  There was 1 tumor deposit present.  Final pathological staging was pT3, pN1b.  MMR proteins intact.     Started adjuvant therapy for low risk stage III colon cancer with CapeOx  Cycle 1 10/5/2023      Interval History   Shaylee Jackson is a 53 year old female with resected lower stage III colon cancer who is currently on adjuvant chemotherapy with Xeloda and oxaliplatin is seen as a video visit for toxicity check after cycle 1.    As mentioned earlier she has low risk stage III colon cancer which was resected.  pT3, and pN1b.  No high risk features.  She had 1 tumor deposit.  I recommended adjuvant chemotherapy with 3 months of CapeOx.  She did fairly well with the first infusion.  No  infusion reactions.  She is tolerating Xarelto pretty well.  No significant side effects.  Some nausea and some diarrhea.  But these are better now.  Also has some expected oxaliplatin induced cold sensitivity.        Review of Systems  A comprehensive review of systems was negative except for what is noted in the interval history    Current Outpatient Medications   Medication     capecitabine (XELODA) 500 MG tablet     dexAMETHasone (DECADRON) 4 MG tablet     lidocaine-prilocaine (EMLA) 2.5-2.5 % external cream     ondansetron (ZOFRAN) 8 MG tablet     prochlorperazine (COMPAZINE) 10 MG tablet     No current facility-administered medications for this visit.        Physical Exam        General: alert and cooperative  HEENT: Head: Normal, normocephalic, atraumatic.  Eye: Normal external eye, conjunctiva, lids cornea, BHASKAR.  Chest: Clear to auscultation bilaterally  Cardiac: S1, S2 normal, regular rate and rhythm  Abdomen: abdomen is soft without significant tenderness, masses, organomegaly or guarding  Extremities: atraumatic, no peripheral edema  Skin:   CNS: Alert and oriented x3, neurologic exam grossly normal.  Lymphatics: No bilateral cervical, axillary, supraclavicular or inguinal adenopathy noted    Lab Results    Recent Results (from the past 168 hour(s))   Comprehensive metabolic panel   Result Value Ref Range    Sodium 137 135 - 145 mmol/L    Potassium 3.8 3.4 - 5.3 mmol/L    Carbon Dioxide (CO2) 28 22 - 29 mmol/L    Anion Gap 7 7 - 15 mmol/L    Urea Nitrogen 8.4 6.0 - 20.0 mg/dL    Creatinine 0.75 0.51 - 0.95 mg/dL    GFR Estimate >90 >60 mL/min/1.73m2    Calcium 9.0 8.6 - 10.0 mg/dL    Chloride 102 98 - 107 mmol/L    Glucose 103 (H) 70 - 99 mg/dL    Alkaline Phosphatase 50 35 - 104 U/L    AST 17 0 - 45 U/L    ALT 11 0 - 50 U/L    Protein Total 7.2 6.4 - 8.3 g/dL    Albumin 4.1 3.5 - 5.2 g/dL    Bilirubin Total 0.4 <=1.2 mg/dL   CBC with platelets and differential   Result Value Ref Range    WBC Count 7.7  4.0 - 11.0 10e3/uL    RBC Count 3.97 3.80 - 5.20 10e6/uL    Hemoglobin 8.2 (L) 11.7 - 15.7 g/dL    Hematocrit 27.2 (L) 35.0 - 47.0 %    MCV 69 (L) 78 - 100 fL    MCH 20.7 (L) 26.5 - 33.0 pg    MCHC 30.1 (L) 31.5 - 36.5 g/dL    RDW 18.6 (H) 10.0 - 15.0 %    Platelet Count 424 150 - 450 10e3/uL    % Neutrophils 54 %    % Lymphocytes 35 %    % Monocytes 4 %    Mids % (Monos, Eos, Basos)      % Eosinophils 7 %    % Basophils 0 %    % Immature Granulocytes 0 %    NRBCs per 100 WBC 0 <1 /100    Absolute Neutrophils 4.2 1.6 - 8.3 10e3/uL    Absolute Lymphocytes 2.7 0.8 - 5.3 10e3/uL    Absolute Monocytes 0.3 0.0 - 1.3 10e3/uL    Mids Abs (Monos, Eos, Basos)      Absolute Eosinophils 0.5 0.0 - 0.7 10e3/uL    Absolute Basophils 0.0 0.0 - 0.2 10e3/uL    Absolute Immature Granulocytes 0.0 <=0.4 10e3/uL    Absolute NRBCs 0.0 10e3/uL       Imaging    MA Stereotactic Breast Biopsy Vacuum Lt    Result Date: 10/5/2023  STEREOTACTIC BREAST BIOPSY   HISTORY:  Patient presents for stereotactic biopsy of left breast breast microcalcifications.   PROCEDURE:  The risks and benefits of the procedure were explained to the patient and the patient signed a written consent form. The patient was positioned on the stereotactic biopsy table. The microcalcifications at the 12 o'clock position in the left breast were localized with stereotactic guidance using a CC compression and superior to inferior needle approach. 7 mL1% lidocaine was used for local anesthesia. A small skin incision was made.  A 9 gauge Brevera biopsy needle was advanced to the region of the calcifications.  Additional stereotactic views were performed to confirm satisfactory position of the needle.  5 mL1% lidocaine with epinephrine was used for deeper anesthesia.  2 core biopsies were then obtained.  Calcifications were seen within samples.  A hydromark coil biopsy marking clip was placed. There were no immediate complications.   Pressure was held for hemostasis and the site  was cleansed and bandaged.   The patient was taken off the stereo table and a post procedure mammogram was performed. Post Procedure Mammogram for Marker Placement:  The biopsy marking clip is in the expected location. There were no immediate complications. Discharge care instructions were given to the patient.   SUMMARY: Stereotactic biopsy of calcifications in the left breast at 12:00, this is marked with a hydromark coil-shaped marking clip. PATHOLOGY: Breast, left, 12 o'clock, Stereotactic, needle core biopsy: -  Columnar cell change and calcifications RECOMMENDATION: Results are concordant. Recommend yearly screening mammography. The results of this biopsy and any recommended follow up will be discussed with the patient by the [facility] when results are available.    MM Post Mammogram Lt    Result Date: 10/5/2023  STEREOTACTIC BREAST BIOPSY   HISTORY:  Patient presents for stereotactic biopsy of left breast breast microcalcifications.   PROCEDURE:  The risks and benefits of the procedure were explained to the patient and the patient signed a written consent form. The patient was positioned on the stereotactic biopsy table. The microcalcifications at the 12 o'clock position in the left breast were localized with stereotactic guidance using a CC compression and superior to inferior needle approach. 7 mL1% lidocaine was used for local anesthesia. A small skin incision was made.  A 9 gauge Brevera biopsy needle was advanced to the region of the calcifications.  Additional stereotactic views were performed to confirm satisfactory position of the needle.  5 mL1% lidocaine with epinephrine was used for deeper anesthesia.  2 core biopsies were then obtained.  Calcifications were seen within samples.  A hydromark coil biopsy marking clip was placed. There were no immediate complications.   Pressure was held for hemostasis and the site was cleansed and bandaged.   The patient was taken off the stereo  table and a post procedure mammogram was performed. Post Procedure Mammogram for Marker Placement:  The biopsy marking clip is in the expected location. There were no immediate complications. Discharge care instructions were given to the patient.   SUMMARY: Stereotactic biopsy of calcifications in the left breast at 12:00, this is marked with a hydromark coil-shaped marking clip. PATHOLOGY: Breast, left, 12 o'clock, Stereotactic, needle core biopsy: -  Columnar cell change and calcifications RECOMMENDATION: Results are concordant. Recommend yearly screening mammography. The results of this biopsy and any recommended follow up will be discussed with the patient by the [facility] when results are available.    XR Chest Port 1 View    Result Date: 10/4/2023  CHEST PORTABLE ONE VIEW  10/4/2023 3:32 PM HISTORY: Status post right IJ port placement. COMPARISON: None     IMPRESSION: Cardiac silhouette is within normal limits. No infiltrate, effusion, or pneumothorax. Right IJ port is present with the tip overlying the mid SVC. No pneumothorax. No bony abnormalities.  WARREN NEGRO MD   SYSTEM ID:  [id number]    XR Surgery [place] Fluoro Less Than 5 Min    Result Date: 10/4/2023  This exam was marked as non-reportable because it will not be read by a radiologist or a [place] non-radiologist provider.     MM Mammogram Diag Lt    HISTORY: Screening call back on the left COMPARISON: 09/27/2023, 04/22/2022 12/18/2007 FINDINGS: DIAGNOSTIC MAMMOGRAM: Left breast magnification views. Today's images demonstrate a 3 to 4 cm group of coarse calcifications in the left breast at approximately 12:00, posterior depth, for which stereotactic biopsy is recommended. IMPRESSION/RECOMMENDATION: ACR BI-RADS CATEGORY 4:  Suspicious. Recommend stereotactic biopsy of the calcifications in the left breast at 12:00, posterior depth. The results of this examination and recommended follow up were discussed with the patient. The [facility] will attempt to schedule recommended follow up with the patient.    MM Mammogram Screening Bilat W 3D Adina W CAD    Result Date: 9/27/2023  MM MAMMOGRAM SCREENING BILAT W 3D ADINA W CAD performed on 9/27/23 Compared to: 04/22/2022 MM Mammogram Screening Bilat W 3D Adina W CAD and 12/18/2007 MM Mammogram Screening Bilat W CAD   FINDINGS: Bilateral screening mammogram was performed with the assistance of Computer-Aided Detection and breast tomosynthesis. The breasts are extremely dense, which lowers the sensitivity of mammography. There are indeterminate calcifications in the left breast at the 12 o'clock position at posterior depth. The remainder of the breast tissue is unremarkable.    : ACR BI-RADS Category: 0 - Incomplete: Needs Additional Imaging Evaluation (left) RECOMMENDATION: Magnification Views The provided family history indicates that the patient might be at a high lifetime risk of breast cancer. Consider a formal risk assessment if not previously performed. The results and recommendations of this examination will be communicated to the patient and the imaging center will attempt to schedule any recommended follow up with the patient. As a result of the 21st Century Cures Act, all medical imaging exams are released immediately to [facility].  You may be viewing this report before our scheduling staff and your referring provider.  We will attempt to contact you by phone within one business day of this report to schedule any recommended follow-up exams.  If you have questions, please contact your health care provider.     A total of 30 min were spent today on this visit which included face to face conversation with the patient, EMR review, counseling and co-ordination of care and medical documentation.    Signed by: Tuan Cohn MD      Again, thank you for allowing me to participate in the care of your patient.        Sincerely,        Tuan Cohn MD

## 2023-10-12 NOTE — LETTER
10/12/2023         RE: Shaylee Jackson  [address] [place] [address]        Dear Colleague,    Thank you for referring your patient, Shaylee Jackson, to the [facility]. Please see a copy of my visit note below.    Virtual Visit Details    Type of service:  Video Visit   Video Start time: 2:57 PM  Video stop time: 3:07 PM  Originating Location (pt. Location): Home    Distant Location (provider location):  Off-site  Platform used for Video Visit: [facility] Progress Note    Patient: Shaylee Jackson  MRN: [id number]  10/12/23        Reason for Visit    Chief Complaint   Patient presents with     RECHECK         Problem List Items Addressed This Visit    None        Assessment and Plan  Resected stage III sigmoid colon cancer  pT3, pN1b, cM0  MMR proteins intact  No high risk features except for tumor deposit (1)    Currently on adjuvant therapy with CapeOx.  Intended duration of treatment is 3 months.  Received first infusion on 10/5/2023.  Did well with it.  This is a toxicity check.  She has done fairly well on treatment so far.  Tolerating Xeloda okay.    Labs from yesterday reviewed.  Hemoglobin stable at 8.2.  She also has significant iron deficiency and got IV iron infusions.  She has received 3 doses of Venofer so far.  Total white count was normal at 7.7.  Platelet count of 424.  Serum chemistry was within normal limits.    She has 1 more week of Xeloda to go for this cycle.  No evidence of hand-foot syndrome.  She does have expected oxaliplatin induced cold sensitivity.  Did have some nausea type symptoms.  Plan is to continue Xeloda 2 weeks on 1 week off and oxaliplatin once every 3 weeks for total of 4 cycles.     Cancer Staging   No matching staging information was found for the patient.      ECOG Performance    0 - Independent         Problem List    Patient Active Problem List   Diagnosis     Iron deficiency  anemia due to chronic blood loss     Malignant neoplasm of sigmoid colon (H)        Oncology history  Screening colonoscopy on 8/9/2023 showed a fungating polypoid ulcerated obstructing mass in the sigmoid colon located at 30 cm from the anal verge.  Biopsies were taken.  CEA was elevated at 6.4.  Biopsy came back showing high-grade dysplasia without obvious evidence of invasive cancer.  MMR proteins were intact.  CT chest abdomen pelvis done on 8/10/2023 showed circumferential thickening of the sigmoid colon wall reflecting known possible malignancy.  There were multiple conspicuous inferior mesenteric lymph nodes concerning for metastatic disease.  There were 2 indeterminate hepatic lesions measuring up to 1.1 cm.  This was evaluated with an MRI and was deemed to be simple cysts.  No evidence of any metastatic disease.  She underwent laparoscopic sigmoidectomy on 8/24/2023.  Surgical path has come back showing a 6.3 cm moderately differentiated adenocarcinoma of the sigmoid colon which invades muscularis propria into the pericolonic tissue.  No evidence of any tumor perforation.  No evidence of LVI.  No perineural invasion.  All margins were negative.  64 lymph nodes were removed autofused 3 were positive for metastatic disease.  There was 1 tumor deposit present.  Final pathological staging was pT3, pN1b.  MMR proteins intact.     Started adjuvant therapy for low risk stage III colon cancer with CapeOx  Cycle 1 10/5/2023      Interval History   Shaylee Jackson is a 53 year old female with resected lower stage III colon cancer who is currently on adjuvant chemotherapy with Xeloda and oxaliplatin is seen as a video visit for toxicity check after cycle 1.    As mentioned earlier she has low risk stage III colon cancer which was resected.  pT3, and pN1b.  No high risk features.  She had 1 tumor deposit.  I recommended adjuvant chemotherapy with 3 months of CapeOx.  She did fairly well with the first infusion.  No  infusion reactions.  She is tolerating Xarelto pretty well.  No significant side effects.  Some nausea and some diarrhea.  But these are better now.  Also has some expected oxaliplatin induced cold sensitivity.        Review of Systems  A comprehensive review of systems was negative except for what is noted in the interval history    Current Outpatient Medications   Medication     capecitabine (XELODA) 500 MG tablet     dexAMETHasone (DECADRON) 4 MG tablet     lidocaine-prilocaine (EMLA) 2.5-2.5 % external cream     ondansetron (ZOFRAN) 8 MG tablet     prochlorperazine (COMPAZINE) 10 MG tablet     No current facility-administered medications for this visit.        Physical Exam        General: alert and cooperative  HEENT: Head: Normal, normocephalic, atraumatic.  Eye: Normal external eye, conjunctiva, lids cornea, BHASKAR.  Chest: Clear to auscultation bilaterally  Cardiac: S1, S2 normal, regular rate and rhythm  Abdomen: abdomen is soft without significant tenderness, masses, organomegaly or guarding  Extremities: atraumatic, no peripheral edema  Skin:   CNS: Alert and oriented x3, neurologic exam grossly normal.  Lymphatics: No bilateral cervical, axillary, supraclavicular or inguinal adenopathy noted    Lab Results    Recent Results (from the past 168 hour(s))   Comprehensive metabolic panel   Result Value Ref Range    Sodium 137 135 - 145 mmol/L    Potassium 3.8 3.4 - 5.3 mmol/L    Carbon Dioxide (CO2) 28 22 - 29 mmol/L    Anion Gap 7 7 - 15 mmol/L    Urea Nitrogen 8.4 6.0 - 20.0 mg/dL    Creatinine 0.75 0.51 - 0.95 mg/dL    GFR Estimate >90 >60 mL/min/1.73m2    Calcium 9.0 8.6 - 10.0 mg/dL    Chloride 102 98 - 107 mmol/L    Glucose 103 (H) 70 - 99 mg/dL    Alkaline Phosphatase 50 35 - 104 U/L    AST 17 0 - 45 U/L    ALT 11 0 - 50 U/L    Protein Total 7.2 6.4 - 8.3 g/dL    Albumin 4.1 3.5 - 5.2 g/dL    Bilirubin Total 0.4 <=1.2 mg/dL   CBC with platelets and differential   Result Value Ref Range    WBC Count 7.7  4.0 - 11.0 10e3/uL    RBC Count 3.97 3.80 - 5.20 10e6/uL    Hemoglobin 8.2 (L) 11.7 - 15.7 g/dL    Hematocrit 27.2 (L) 35.0 - 47.0 %    MCV 69 (L) 78 - 100 fL    MCH 20.7 (L) 26.5 - 33.0 pg    MCHC 30.1 (L) 31.5 - 36.5 g/dL    RDW 18.6 (H) 10.0 - 15.0 %    Platelet Count 424 150 - 450 10e3/uL    % Neutrophils 54 %    % Lymphocytes 35 %    % Monocytes 4 %    Mids % (Monos, Eos, Basos)      % Eosinophils 7 %    % Basophils 0 %    % Immature Granulocytes 0 %    NRBCs per 100 WBC 0 <1 /100    Absolute Neutrophils 4.2 1.6 - 8.3 10e3/uL    Absolute Lymphocytes 2.7 0.8 - 5.3 10e3/uL    Absolute Monocytes 0.3 0.0 - 1.3 10e3/uL    Mids Abs (Monos, Eos, Basos)      Absolute Eosinophils 0.5 0.0 - 0.7 10e3/uL    Absolute Basophils 0.0 0.0 - 0.2 10e3/uL    Absolute Immature Granulocytes 0.0 <=0.4 10e3/uL    Absolute NRBCs 0.0 10e3/uL       Imaging    MA Stereotactic Breast Biopsy Vacuum Lt    Result Date: 10/5/2023  STEREOTACTIC BREAST BIOPSY   HISTORY:  Patient presents for stereotactic biopsy of left breast breast microcalcifications.   PROCEDURE:  The risks and benefits of the procedure were explained to the patient and the patient signed a written consent form. The patient was positioned on the stereotactic biopsy table. The microcalcifications at the 12 o'clock position in the left breast were localized with stereotactic guidance using a CC compression and superior to inferior needle approach. 7 mL1% lidocaine was used for local anesthesia. A small skin incision was made.  A 9 gauge Brevera biopsy needle was advanced to the region of the calcifications.  Additional stereotactic views were performed to confirm satisfactory position of the needle.  5 mL1% lidocaine with epinephrine was used for deeper anesthesia.  2 core biopsies were then obtained.  Calcifications were seen within samples.  A hydromark coil biopsy marking clip was placed. There were no immediate complications.   Pressure was held for hemostasis and the site  was cleansed and bandaged.   The patient was taken off the stereo table and a post procedure mammogram was performed. Post Procedure Mammogram for Marker Placement:  The biopsy marking clip is in the expected location. There were no immediate complications. Discharge care instructions were given to the patient.   SUMMARY: Stereotactic biopsy of calcifications in the left breast at 12:00, this is marked with a hydromark coil-shaped marking clip. PATHOLOGY: Breast, left, 12 o'clock, Stereotactic, needle core biopsy: -  Columnar cell change and calcifications RECOMMENDATION: Results are concordant. Recommend yearly screening mammography. The results of this biopsy and any recommended follow up will be discussed with the patient by the [facility] when results are available.    MM Post Mammogram Lt    Result Date: 10/5/2023  STEREOTACTIC BREAST BIOPSY   HISTORY:  Patient presents for stereotactic biopsy of left breast breast microcalcifications.   PROCEDURE:  The risks and benefits of the procedure were explained to the patient and the patient signed a written consent form. The patient was positioned on the stereotactic biopsy table. The microcalcifications at the 12 o'clock position in the left breast were localized with stereotactic guidance using a CC compression and superior to inferior needle approach. 7 mL1% lidocaine was used for local anesthesia. A small skin incision was made.  A 9 gauge Brevera biopsy needle was advanced to the region of the calcifications.  Additional stereotactic views were performed to confirm satisfactory position of the needle.  5 mL1% lidocaine with epinephrine was used for deeper anesthesia.  2 core biopsies were then obtained.  Calcifications were seen within samples.  A hydromark coil biopsy marking clip was placed. There were no immediate complications.   Pressure was held for hemostasis and the site was cleansed and bandaged.   The patient was taken off the stereo  table and a post procedure mammogram was performed. Post Procedure Mammogram for Marker Placement:  The biopsy marking clip is in the expected location. There were no immediate complications. Discharge care instructions were given to the patient.   SUMMARY: Stereotactic biopsy of calcifications in the left breast at 12:00, this is marked with a hydromark coil-shaped marking clip. PATHOLOGY: Breast, left, 12 o'clock, Stereotactic, needle core biopsy: -  Columnar cell change and calcifications RECOMMENDATION: Results are concordant. Recommend yearly screening mammography. The results of this biopsy and any recommended follow up will be discussed with the patient by the [facility] when results are available.    XR Chest Port 1 View    Result Date: 10/4/2023  CHEST PORTABLE ONE VIEW  10/4/2023 3:32 PM HISTORY: Status post right IJ port placement. COMPARISON: None     IMPRESSION: Cardiac silhouette is within normal limits. No infiltrate, effusion, or pneumothorax. Right IJ port is present with the tip overlying the mid SVC. No pneumothorax. No bony abnormalities.  WARREN NEGRO MD   SYSTEM ID:  [id number]    XR Surgery JERSEY Fluoro Less Than 5 Min    Result Date: 10/4/2023  This exam was marked as non-reportable because it will not be read by a radiologist or a [place] non-radiologist provider.     MM Mammogram Diag Lt    HISTORY: Screening call back on the left COMPARISON: 09/27/2023, 04/22/2022 12/18/2007 FINDINGS: DIAGNOSTIC MAMMOGRAM: Left breast magnification views. Today's images demonstrate a 3 to 4 cm group of coarse calcifications in the left breast at approximately 12:00, posterior depth, for which stereotactic biopsy is recommended. IMPRESSION/RECOMMENDATION: ACR BI-RADS CATEGORY 4:  Suspicious. Recommend stereotactic biopsy of the calcifications in the left breast at 12:00, posterior depth. The results of this examination and recommended follow up were discussed with the patient. The [facility] will attempt to schedule recommended follow up with the patient.    MM Mammogram Screening Bilat W 3D Adina W CAD    Result Date: 9/27/2023  MM MAMMOGRAM SCREENING BILAT W 3D ADINA W CAD performed on 9/27/23 Compared to: 04/22/2022 MM Mammogram Screening Bilat W 3D Adina W CAD and 12/18/2007 MM Mammogram Screening Bilat W CAD   FINDINGS: Bilateral screening mammogram was performed with the assistance of Computer-Aided Detection and breast tomosynthesis. The breasts are extremely dense, which lowers the sensitivity of mammography. There are indeterminate calcifications in the left breast at the 12 o'clock position at posterior depth. The remainder of the breast tissue is unremarkable.    : ACR BI-RADS Category: 0 - Incomplete: Needs Additional Imaging Evaluation (left) RECOMMENDATION: Magnification Views The provided family history indicates that the patient might be at a high lifetime risk of breast cancer. Consider a formal risk assessment if not previously performed. The results and recommendations of this examination will be communicated to the patient and the imaging center will attempt to schedule any recommended follow up with the patient. As a result of the 21st Century Cures Act, all medical imaging exams are released immediately to [facility].  You may be viewing this report before our scheduling staff and your referring provider.  We will attempt to contact you by phone within one business day of this report to schedule any recommended follow-up exams.  If you have questions, please contact your health care provider.     A total of 30 min were spent today on this visit which included face to face conversation with the patient, EMR review, counseling and co-ordination of care and medical documentation.    Signed by: Tuan Cohn MD      Again, thank you for allowing me to participate in the care of your patient.        Sincerely,        Tuan Cohn MD

## 2023-10-12 NOTE — PROGRESS NOTES
Virtual Visit Details    Type of service:  Video Visit   Video Start time: 2:57 PM  Video stop time: 3:07 PM  Originating Location (pt. Location): Home    Distant Location (provider location):  Off-site  Platform used for Video Visit: [facility]ematology [facility] Oncology Progress Note    Patient: Shaylee Jackson  MRN: [id number]  10/12/23        Reason for Visit    Chief Complaint   Patient presents with    RECHECK         Problem List Items Addressed This Visit    None        Assessment and Plan  Resected stage III sigmoid colon cancer  pT3, pN1b, cM0  MMR proteins intact  No high risk features except for tumor deposit (1)    Currently on adjuvant therapy with CapeOx.  Intended duration of treatment is 3 months.  Received first infusion on 10/5/2023.  Did well with it.  This is a toxicity check.  She has done fairly well on treatment so far.  Tolerating Xeloda okay.    Labs from yesterday reviewed.  Hemoglobin stable at 8.2.  She also has significant iron deficiency and got IV iron infusions.  She has received 3 doses of Venofer so far.  Total white count was normal at 7.7.  Platelet count of 424.  Serum chemistry was within normal limits.    She has 1 more week of Xeloda to go for this cycle.  No evidence of hand-foot syndrome.  She does have expected oxaliplatin induced cold sensitivity.  Did have some nausea type symptoms.  Plan is to continue Xeloda 2 weeks on 1 week off and oxaliplatin once every 3 weeks for total of 4 cycles.     Cancer Staging   No matching staging information was found for the patient.      ECOG Performance    0 - Independent         Problem List    Patient Active Problem List   Diagnosis    Iron deficiency anemia due to chronic blood loss    Malignant neoplasm of sigmoid colon (H)        Oncology history  Screening colonoscopy on 8/9/2023 showed a fungating polypoid ulcerated obstructing mass in the sigmoid colon located at 30 cm from the anal verge.  Biopsies were taken.   CEA was elevated at 6.4.  Biopsy came back showing high-grade dysplasia without obvious evidence of invasive cancer.  MMR proteins were intact.  CT chest abdomen pelvis done on 8/10/2023 showed circumferential thickening of the sigmoid colon wall reflecting known possible malignancy.  There were multiple conspicuous inferior mesenteric lymph nodes concerning for metastatic disease.  There were 2 indeterminate hepatic lesions measuring up to 1.1 cm.  This was evaluated with an MRI and was deemed to be simple cysts.  No evidence of any metastatic disease.  She underwent laparoscopic sigmoidectomy on 8/24/2023.  Surgical path has come back showing a 6.3 cm moderately differentiated adenocarcinoma of the sigmoid colon which invades muscularis propria into the pericolonic tissue.  No evidence of any tumor perforation.  No evidence of LVI.  No perineural invasion.  All margins were negative.  64 lymph nodes were removed autofused 3 were positive for metastatic disease.  There was 1 tumor deposit present.  Final pathological staging was pT3, pN1b.  MMR proteins intact.     Started adjuvant therapy for low risk stage III colon cancer with CapeOx  Cycle 1 10/5/2023      Interval History   Shaylee Jackson is a 53 year old female with resected lower stage III colon cancer who is currently on adjuvant chemotherapy with Xeloda and oxaliplatin is seen as a video visit for toxicity check after cycle 1.    As mentioned earlier she has low risk stage III colon cancer which was resected.  pT3, and pN1b.  No high risk features.  She had 1 tumor deposit.  I recommended adjuvant chemotherapy with 3 months of CapeOx.  She did fairly well with the first infusion.  No infusion reactions.  She is tolerating Xarelto pretty well.  No significant side effects.  Some nausea and some diarrhea.  But these are better now.  Also has some expected oxaliplatin induced cold sensitivity.        Review of Systems  A comprehensive review of systems was  negative except for what is noted in the interval history    Current Outpatient Medications   Medication    capecitabine (XELODA) 500 MG tablet    dexAMETHasone (DECADRON) 4 MG tablet    lidocaine-prilocaine (EMLA) 2.5-2.5 % external cream    ondansetron (ZOFRAN) 8 MG tablet    prochlorperazine (COMPAZINE) 10 MG tablet     No current facility-administered medications for this visit.        Physical Exam        General: alert and cooperative  HEENT: Head: Normal, normocephalic, atraumatic.  Eye: Normal external eye, conjunctiva, lids cornea, BHASKAR.  Chest: Clear to auscultation bilaterally  Cardiac: S1, S2 normal, regular rate and rhythm  Abdomen: abdomen is soft without significant tenderness, masses, organomegaly or guarding  Extremities: atraumatic, no peripheral edema  Skin:   CNS: Alert and oriented x3, neurologic exam grossly normal.  Lymphatics: No bilateral cervical, axillary, supraclavicular or inguinal adenopathy noted    Lab Results    Recent Results (from the past 168 hour(s))   Comprehensive metabolic panel   Result Value Ref Range    Sodium 137 135 - 145 mmol/L    Potassium 3.8 3.4 - 5.3 mmol/L    Carbon Dioxide (CO2) 28 22 - 29 mmol/L    Anion Gap 7 7 - 15 mmol/L    Urea Nitrogen 8.4 6.0 - 20.0 mg/dL    Creatinine 0.75 0.51 - 0.95 mg/dL    GFR Estimate >90 >60 mL/min/1.73m2    Calcium 9.0 8.6 - 10.0 mg/dL    Chloride 102 98 - 107 mmol/L    Glucose 103 (H) 70 - 99 mg/dL    Alkaline Phosphatase 50 35 - 104 U/L    AST 17 0 - 45 U/L    ALT 11 0 - 50 U/L    Protein Total 7.2 6.4 - 8.3 g/dL    Albumin 4.1 3.5 - 5.2 g/dL    Bilirubin Total 0.4 <=1.2 mg/dL   CBC with platelets and differential   Result Value Ref Range    WBC Count 7.7 4.0 - 11.0 10e3/uL    RBC Count 3.97 3.80 - 5.20 10e6/uL    Hemoglobin 8.2 (L) 11.7 - 15.7 g/dL    Hematocrit 27.2 (L) 35.0 - 47.0 %    MCV 69 (L) 78 - 100 fL    MCH 20.7 (L) 26.5 - 33.0 pg    MCHC 30.1 (L) 31.5 - 36.5 g/dL    RDW 18.6 (H) 10.0 - 15.0 %    Platelet Count 424 150 -  450 10e3/uL    % Neutrophils 54 %    % Lymphocytes 35 %    % Monocytes 4 %    Mids % (Monos, Eos, Basos)      % Eosinophils 7 %    % Basophils 0 %    % Immature Granulocytes 0 %    NRBCs per 100 WBC 0 <1 /100    Absolute Neutrophils 4.2 1.6 - 8.3 10e3/uL    Absolute Lymphocytes 2.7 0.8 - 5.3 10e3/uL    Absolute Monocytes 0.3 0.0 - 1.3 10e3/uL    Mids Abs (Monos, Eos, Basos)      Absolute Eosinophils 0.5 0.0 - 0.7 10e3/uL    Absolute Basophils 0.0 0.0 - 0.2 10e3/uL    Absolute Immature Granulocytes 0.0 <=0.4 10e3/uL    Absolute NRBCs 0.0 10e3/uL       Imaging    MA Stereotactic Breast Biopsy Vacuum Lt    Result Date: 10/5/2023  STEREOTACTIC BREAST BIOPSY   HISTORY:  Patient presents for stereotactic biopsy of left breast breast microcalcifications.   PROCEDURE:  The risks and benefits of the procedure were explained to the patient and the patient signed a written consent form. The patient was positioned on the stereotactic biopsy table. The microcalcifications at the 12 o'clock position in the left breast were localized with stereotactic guidance using a CC compression and superior to inferior needle approach. 7 mL1% lidocaine was used for local anesthesia. A small skin incision was made.  A 9 gauge Brevera biopsy needle was advanced to the region of the calcifications.  Additional stereotactic views were performed to confirm satisfactory position of the needle.  5 mL1% lidocaine with epinephrine was used for deeper anesthesia.  2 core biopsies were then obtained.  Calcifications were seen within samples.  A hydromark coil biopsy marking clip was placed. There were no immediate complications.   Pressure was held for hemostasis and the site was cleansed and bandaged.   The patient was taken off the stereo table and a post procedure mammogram was performed. Post Procedure Mammogram for Marker Placement:  The biopsy marking clip is in the expected location. There were no immediate complications. Discharge care  instructions were given to the patient.   SUMMARY: Stereotactic biopsy of calcifications in the left breast at 12:00, this is marked with a hydromark coil-shaped marking clip. PATHOLOGY: Breast, left, 12 o'clock, Stereotactic, needle core biopsy: -  Columnar cell change and calcifications RECOMMENDATION: Results are concordant. Recommend yearly screening mammography. The results of this biopsy and any recommended follow up will be discussed with the patient by the [facility] when results are available.    MM Post Mammogram Lt    Result Date: 10/5/2023  STEREOTACTIC BREAST BIOPSY   HISTORY:  Patient presents for stereotactic biopsy of left breast breast microcalcifications.   PROCEDURE:  The risks and benefits of the procedure were explained to the patient and the patient signed a written consent form. The patient was positioned on the stereotactic biopsy table. The microcalcifications at the 12 o'clock position in the left breast were localized with stereotactic guidance using a CC compression and superior to inferior needle approach. 7 mL1% lidocaine was used for local anesthesia. A small skin incision was made.  A 9 gauge Brevera biopsy needle was advanced to the region of the calcifications.  Additional stereotactic views were performed to confirm satisfactory position of the needle.  5 mL1% lidocaine with epinephrine was used for deeper anesthesia.  2 core biopsies were then obtained.  Calcifications were seen within samples.  A hydromark coil biopsy marking clip was placed. There were no immediate complications.   Pressure was held for hemostasis and the site was cleansed and bandaged.   The patient was taken off the stereo table and a post procedure mammogram was performed. Post Procedure Mammogram for Marker Placement:  The biopsy marking clip is in the expected location. There were no immediate complications. Discharge care instructions were given to the patient.   SUMMARY: Stereotactic  biopsy of calcifications in the left breast at 12:00, this is marked with a hydromark coil-shaped marking clip. PATHOLOGY: Breast, left, 12 o'clock, Stereotactic, needle core biopsy: -  Columnar cell change and calcifications RECOMMENDATION: Results are concordant. Recommend yearly screening mammography. The results of this biopsy and any recommended follow up will be discussed with the patient by the [facility] when results are available.    XR Chest Port 1 View    Result Date: 10/4/2023  CHEST PORTABLE ONE VIEW  10/4/2023 3:32 PM HISTORY: Status post right IJ port placement. COMPARISON: None     IMPRESSION: Cardiac silhouette is within normal limits. No infiltrate, effusion, or pneumothorax. Right IJ port is present with the tip overlying the mid SVC. No pneumothorax. No bony abnormalities.  WARREN NEGRO MD   SYSTEM ID:  [id number]    XR Surgery JERSEY Fluoro Less Than 5 Min    Result Date: 10/4/2023  This exam was marked as non-reportable because it will not be read by a radiologist or a [place] non-radiologist provider.     MM Mammogram Diag Lt    HISTORY: Screening call back on the left COMPARISON: 09/27/2023, 04/22/2022 12/18/2007 FINDINGS: DIAGNOSTIC MAMMOGRAM: Left breast magnification views. Today's images demonstrate a 3 to 4 cm group of coarse calcifications in the left breast at approximately 12:00, posterior depth, for which stereotactic biopsy is recommended. IMPRESSION/RECOMMENDATION: ACR BI-RADS CATEGORY 4:  Suspicious. Recommend stereotactic biopsy of the calcifications in the left breast at 12:00, posterior depth. The results of this examination and recommended follow up were discussed with the patient. The [facility] will attempt to schedule recommended follow up with the patient.    MM Mammogram Screening Bilat W 3D Adina W CAD    Result Date: 9/27/2023  MM MAMMOGRAM SCREENING BILAT W 3D ADINA W CAD performed on 9/27/23 Compared to: 04/22/2022 MM Mammogram  Screening Bilat W 3D Fredo W CAD and 12/18/2007 MM Mammogram Screening Bilat W CAD   FINDINGS: Bilateral screening mammogram was performed with the assistance of Computer-Aided Detection and breast tomosynthesis. The breasts are extremely dense, which lowers the sensitivity of mammography. There are indeterminate calcifications in the left breast at the 12 o'clock position at posterior depth. The remainder of the breast tissue is unremarkable.    : ACR BI-RADS Category: 0 - Incomplete: Needs Additional Imaging Evaluation (left) RECOMMENDATION: Magnification Views The provided family history indicates that the patient might be at a high lifetime risk of breast cancer. Consider a formal risk assessment if not previously performed. The results and recommendations of this examination will be communicated to the patient and the imaging center will attempt to schedule any recommended follow up with the patient. As a result of the 21st Century Cures Act, all medical imaging exams are released immediately to [facility].  You may be viewing this report before our scheduling staff and your referring provider.  We will attempt to contact you by phone within one business day of this report to schedule any recommended follow-up exams.  If you have questions, please contact your health care provider.     A total of 30 min were spent today on this visit which included face to face conversation with the patient, EMR review, counseling and co-ordination of care and medical documentation.    Signed by: Tuan Cohn MD

## 2023-10-12 NOTE — NURSING NOTE
Is the patient currently in the state of [place]? YES    Visit mode:VIDEO    If the visit is dropped, the patient can be reconnected by: VIDEO VISIT: Send to e-mail at: [email or web address]    Will anyone else be joining the visit? NO  (If patient encounters technical issues they should call [phone]    How would you like to obtain your AVS? MyChart    Are changes needed to the allergy or medication list? No    Reason for visit: RECHVIOLET STAPLETON

## 2023-10-17 DIAGNOSIS — C18.7 MALIGNANT NEOPLASM OF SIGMOID COLON (H): ICD-10-CM

## 2023-10-18 ENCOUNTER — TELEPHONE (OUTPATIENT)
Dept: NUTRITION | Facility: CLINIC | Age: 53
End: 2023-10-18
Payer: COMMERCIAL

## 2023-10-18 NOTE — PROGRESS NOTES
CLINICAL NUTRITION SERVICES - BRIEF NOTE    Patient was contacted by phone due to reporting concerns about request to be contacted by RD on the Oncology Distress Screening tool. Spoke with patient for 15 minutes regarding general nutrition recommendations during cancer treatment. Registered Dietitian contact information provided to patient if further questions arise.    Krys Main RDN, LD  Clinical Dietitian  Office: [phone]  Weekend pager: [phone]

## 2023-10-25 DIAGNOSIS — C18.7 MALIGNANT NEOPLASM OF SIGMOID COLON (H): Primary | ICD-10-CM

## 2023-10-25 RX ORDER — ALBUTEROL SULFATE 0.83 MG/ML
2.5 SOLUTION RESPIRATORY (INHALATION)
Status: CANCELLED | OUTPATIENT
Start: 2023-10-26

## 2023-10-25 RX ORDER — HEPARIN SODIUM (PORCINE) LOCK FLUSH IV SOLN 100 UNIT/ML 100 UNIT/ML
5 SOLUTION INTRAVENOUS
Status: CANCELLED | OUTPATIENT
Start: 2023-10-26

## 2023-10-25 RX ORDER — MEPERIDINE HYDROCHLORIDE 25 MG/ML
25 INJECTION INTRAMUSCULAR; INTRAVENOUS; SUBCUTANEOUS EVERY 30 MIN PRN
Status: CANCELLED | OUTPATIENT
Start: 2023-10-26

## 2023-10-25 RX ORDER — LORAZEPAM 2 MG/ML
0.5 INJECTION INTRAMUSCULAR EVERY 4 HOURS PRN
Status: CANCELLED | OUTPATIENT
Start: 2023-10-26

## 2023-10-25 RX ORDER — DIPHENHYDRAMINE HYDROCHLORIDE 50 MG/ML
50 INJECTION INTRAMUSCULAR; INTRAVENOUS
Status: CANCELLED
Start: 2023-10-26

## 2023-10-25 RX ORDER — METHYLPREDNISOLONE SODIUM SUCCINATE 125 MG/2ML
125 INJECTION, POWDER, LYOPHILIZED, FOR SOLUTION INTRAMUSCULAR; INTRAVENOUS
Status: CANCELLED
Start: 2023-10-26

## 2023-10-25 RX ORDER — EPINEPHRINE 1 MG/ML
0.3 INJECTION, SOLUTION, CONCENTRATE INTRAVENOUS EVERY 5 MIN PRN
Status: CANCELLED | OUTPATIENT
Start: 2023-10-26

## 2023-10-25 RX ORDER — ALBUTEROL SULFATE 90 UG/1
1-2 AEROSOL, METERED RESPIRATORY (INHALATION)
Status: CANCELLED
Start: 2023-10-26

## 2023-10-25 RX ORDER — ONDANSETRON 2 MG/ML
8 INJECTION INTRAMUSCULAR; INTRAVENOUS ONCE
Status: CANCELLED | OUTPATIENT
Start: 2023-10-26

## 2023-10-25 RX ORDER — CAPECITABINE 500 MG/1
1000 TABLET, FILM COATED ORAL 2 TIMES DAILY
Qty: 112 TABLET | Refills: 0 | Status: SHIPPED | OUTPATIENT
Start: 2023-10-25 | End: 2023-11-13

## 2023-10-25 NOTE — ADDENDUM NOTE
Addended by: NGUYỄN ADAMS on: 10/25/2023 02:16 PM     Modules accepted: Orders     Printed for Dr. Tsai review.

## 2023-10-26 ENCOUNTER — MYC MEDICAL ADVICE (OUTPATIENT)
Dept: ONCOLOGY | Facility: CLINIC | Age: 53
End: 2023-10-26

## 2023-10-26 ENCOUNTER — LAB (OUTPATIENT)
Dept: INFUSION THERAPY | Facility: CLINIC | Age: 53
End: 2023-10-26
Attending: INTERNAL MEDICINE
Payer: COMMERCIAL

## 2023-10-26 ENCOUNTER — DOCUMENTATION ONLY (OUTPATIENT)
Dept: ONCOLOGY | Facility: CLINIC | Age: 53
End: 2023-10-26

## 2023-10-26 VITALS
TEMPERATURE: 98 F | SYSTOLIC BLOOD PRESSURE: 108 MMHG | BODY MASS INDEX: 25.5 KG/M2 | WEIGHT: 158 LBS | HEART RATE: 83 BPM | DIASTOLIC BLOOD PRESSURE: 73 MMHG

## 2023-10-26 DIAGNOSIS — C18.7 MALIGNANT NEOPLASM OF SIGMOID COLON (H): Primary | ICD-10-CM

## 2023-10-26 LAB
ALBUMIN SERPL BCG-MCNC: 4 G/DL (ref 3.5–5.2)
ALP SERPL-CCNC: 49 U/L (ref 35–104)
ALT SERPL W P-5'-P-CCNC: 11 U/L (ref 0–50)
ANION GAP SERPL CALCULATED.3IONS-SCNC: 6 MMOL/L (ref 7–15)
AST SERPL W P-5'-P-CCNC: 21 U/L (ref 0–45)
BASOPHILS # BLD AUTO: 0 10E3/UL (ref 0–0.2)
BASOPHILS NFR BLD AUTO: 0 %
BILIRUB SERPL-MCNC: 0.4 MG/DL
BUN SERPL-MCNC: 9.6 MG/DL (ref 6–20)
CALCIUM SERPL-MCNC: 9.3 MG/DL (ref 8.6–10)
CHLORIDE SERPL-SCNC: 106 MMOL/L (ref 98–107)
CREAT SERPL-MCNC: 0.68 MG/DL (ref 0.51–0.95)
DEPRECATED HCO3 PLAS-SCNC: 26 MMOL/L (ref 22–29)
EGFRCR SERPLBLD CKD-EPI 2021: >90 ML/MIN/1.73M2
EOSINOPHIL # BLD AUTO: 0.1 10E3/UL (ref 0–0.7)
EOSINOPHIL NFR BLD AUTO: 2 %
ERYTHROCYTE [DISTWIDTH] IN BLOOD BY AUTOMATED COUNT: 28.3 % (ref 10–15)
GLUCOSE SERPL-MCNC: 114 MG/DL (ref 70–99)
HCT VFR BLD AUTO: 30.2 % (ref 35–47)
HGB BLD-MCNC: 9.3 G/DL (ref 11.7–15.7)
IMM GRANULOCYTES # BLD: 0 10E3/UL
IMM GRANULOCYTES NFR BLD: 0 %
LYMPHOCYTES # BLD AUTO: 1.8 10E3/UL (ref 0.8–5.3)
LYMPHOCYTES NFR BLD AUTO: 25 %
MCH RBC QN AUTO: 23.4 PG (ref 26.5–33)
MCHC RBC AUTO-ENTMCNC: 30.8 G/DL (ref 31.5–36.5)
MCV RBC AUTO: 76 FL (ref 78–100)
MONOCYTES # BLD AUTO: 0.7 10E3/UL (ref 0–1.3)
MONOCYTES NFR BLD AUTO: 9 %
NEUTROPHILS # BLD AUTO: 4.6 10E3/UL (ref 1.6–8.3)
NEUTROPHILS NFR BLD AUTO: 64 %
NRBC # BLD AUTO: 0 10E3/UL
NRBC BLD AUTO-RTO: 0 /100
PLATELET # BLD AUTO: 221 10E3/UL (ref 150–450)
POTASSIUM SERPL-SCNC: 3.6 MMOL/L (ref 3.4–5.3)
PROT SERPL-MCNC: 6.8 G/DL (ref 6.4–8.3)
RBC # BLD AUTO: 3.97 10E6/UL (ref 3.8–5.2)
SODIUM SERPL-SCNC: 138 MMOL/L (ref 135–145)
WBC # BLD AUTO: 7.2 10E3/UL (ref 4–11)

## 2023-10-26 PROCEDURE — 96367 TX/PROPH/DG ADDL SEQ IV INF: CPT

## 2023-10-26 PROCEDURE — 96375 TX/PRO/DX INJ NEW DRUG ADDON: CPT

## 2023-10-26 PROCEDURE — 85018 HEMOGLOBIN: CPT | Performed by: INTERNAL MEDICINE

## 2023-10-26 PROCEDURE — 80053 COMPREHEN METABOLIC PANEL: CPT | Performed by: INTERNAL MEDICINE

## 2023-10-26 PROCEDURE — 36591 DRAW BLOOD OFF VENOUS DEVICE: CPT | Performed by: INTERNAL MEDICINE

## 2023-10-26 PROCEDURE — 96415 CHEMO IV INFUSION ADDL HR: CPT

## 2023-10-26 PROCEDURE — 250N000011 HC RX IP 250 OP 636: Mod: JZ | Performed by: INTERNAL MEDICINE

## 2023-10-26 PROCEDURE — 258N000003 HC RX IP 258 OP 636: Performed by: INTERNAL MEDICINE

## 2023-10-26 PROCEDURE — 96413 CHEMO IV INFUSION 1 HR: CPT

## 2023-10-26 RX ORDER — ONDANSETRON 2 MG/ML
8 INJECTION INTRAMUSCULAR; INTRAVENOUS ONCE
Status: COMPLETED | OUTPATIENT
Start: 2023-10-26 | End: 2023-10-26

## 2023-10-26 RX ORDER — HEPARIN SODIUM (PORCINE) LOCK FLUSH IV SOLN 100 UNIT/ML 100 UNIT/ML
5 SOLUTION INTRAVENOUS
Status: DISCONTINUED | OUTPATIENT
Start: 2023-10-26 | End: 2023-10-26 | Stop reason: HOSPADM

## 2023-10-26 RX ADMIN — ONDANSETRON 8 MG: 2 INJECTION INTRAMUSCULAR; INTRAVENOUS at 10:42

## 2023-10-26 RX ADMIN — DEXAMETHASONE SODIUM PHOSPHATE: 10 INJECTION, SOLUTION INTRAMUSCULAR; INTRAVENOUS at 10:48

## 2023-10-26 RX ADMIN — FAMOTIDINE 20 MG: 10 INJECTION INTRAVENOUS at 11:14

## 2023-10-26 RX ADMIN — DEXTROSE MONOHYDRATE 250 ML: 50 INJECTION, SOLUTION INTRAVENOUS at 10:22

## 2023-10-26 RX ADMIN — SODIUM CHLORIDE, PRESERVATIVE FREE 5 ML: 5 INJECTION INTRAVENOUS at 13:20

## 2023-10-26 RX ADMIN — OXALIPLATIN 250 MG: 5 INJECTION, SOLUTION, CONCENTRATE INTRAVENOUS at 11:17

## 2023-10-26 NOTE — PROGRESS NOTES
Oral Chemotherapy Program  Lab review     Reviewed labs from 10/26/23.     Labs are unremarkable and do not require any dose adjustments at this time.     Follow-up/plan  11/15: Aydee Marc appt & labs     Josafat Savage, PharmD  Hematology/Oncology Clinical Pharmacist  [facility]  [phone]

## 2023-10-26 NOTE — PROGRESS NOTES
"Infusion Nursing Note:  Shaylee Jackson presents today for C2D1 Oxaliplatin.    Patient seen by provider today: No   present during visit today: Not Applicable.    Note: N/A.      Intravenous Access:  Implanted Port.    Treatment Conditions:  Results reviewed, labs MET treatment parameters, ok to proceed with treatment.      Post Infusion Assessment:  Patient tolerated infusion without incident.  Blood return noted pre and post infusion.  Site patent and intact, free from redness, edema or discomfort.  No evidence of extravasations.  Access discontinued per protocol.   Pt did mention following IV disconnect that her throat felt a \"little scratchy\". Denied and difficulty breathing or throat swelling. Reminded pt that sometimes people can have reactions to Oxaliplatin and what symptoms to seek treatment for. She and her daughter verbalized understanding.    Discharge Plan:   Patient discharged in stable condition accompanied by: daughter.  Departure Mode: Ambulatory.      Ashli Waters RN  "

## 2023-10-27 ENCOUNTER — TELEPHONE (OUTPATIENT)
Dept: ONCOLOGY | Facility: CLINIC | Age: 53
End: 2023-10-27
Payer: COMMERCIAL

## 2023-10-27 DIAGNOSIS — C18.7 MALIGNANT NEOPLASM OF SIGMOID COLON (H): ICD-10-CM

## 2023-10-27 NOTE — TELEPHONE ENCOUNTER
Prior Authorization required on Ondansetron  Insurance Phone:[phone]  Patient ID:[id number]  Please contact the pharmacy with Prior Auth status (approved/denied).  Thank you, Valeria Tinoco - Pharmacy [facility]  [phone]    Insurance only will allow 21 tablets per 23 days.

## 2023-10-30 NOTE — TELEPHONE ENCOUNTER
Central Prior Authorization Team   Phone: [phone]    PA Initiation    Medication: ONDANSETRON HCL 8 MG PO TABS  Insurance Company: [employer] [place] - Phone [phone] Fax [phone]  Pharmacy Filling the Rx: [facility] - [place], [place] - [address]  Filling Pharmacy Phone: [phone]  Filling Pharmacy Fax:    Start Date: 10/30/2023

## 2023-11-02 NOTE — TELEPHONE ENCOUNTER
Prior Authorization Approval    Medication: ONDANSETRON HCL 8 MG PO TABS  Authorization Effective Date: 10/30/2023  Authorization Expiration Date: 10/30/2024  Approved Dose/Quantity:   Reference #:    Insurance Company: [employer] [place] - Phone [phone] Fax [phone]  Expected CoPay: $    CoPay Card Available:      Financial Assistance Needed:   Which Pharmacy is filling the prescription: [facility] [place], [place] - [address]  Pharmacy Notified: Yes  Patient Notified: No

## 2023-11-03 RX ORDER — ONDANSETRON 8 MG/1
8 TABLET, FILM COATED ORAL EVERY 8 HOURS PRN
Qty: 30 TABLET | Refills: 2 | Status: SHIPPED | OUTPATIENT
Start: 2023-11-03 | End: 2023-12-01

## 2023-11-13 DIAGNOSIS — C18.7 MALIGNANT NEOPLASM OF SIGMOID COLON (H): Primary | ICD-10-CM

## 2023-11-13 RX ORDER — HEPARIN SODIUM (PORCINE) LOCK FLUSH IV SOLN 100 UNIT/ML 100 UNIT/ML
5 SOLUTION INTRAVENOUS
Status: CANCELLED | OUTPATIENT
Start: 2023-11-15

## 2023-11-13 RX ORDER — LORAZEPAM 2 MG/ML
0.5 INJECTION INTRAMUSCULAR EVERY 4 HOURS PRN
Status: CANCELLED | OUTPATIENT
Start: 2023-11-15

## 2023-11-13 RX ORDER — METHYLPREDNISOLONE SODIUM SUCCINATE 125 MG/2ML
125 INJECTION, POWDER, LYOPHILIZED, FOR SOLUTION INTRAMUSCULAR; INTRAVENOUS
Status: CANCELLED
Start: 2023-11-15

## 2023-11-13 RX ORDER — CAPECITABINE 500 MG/1
1000 TABLET, FILM COATED ORAL 2 TIMES DAILY
Qty: 112 TABLET | Refills: 0 | Status: SHIPPED | OUTPATIENT
Start: 2023-11-15 | End: 2023-12-06

## 2023-11-13 RX ORDER — EPINEPHRINE 1 MG/ML
0.3 INJECTION, SOLUTION INTRAMUSCULAR; SUBCUTANEOUS EVERY 5 MIN PRN
Status: CANCELLED | OUTPATIENT
Start: 2023-11-15

## 2023-11-13 RX ORDER — ALBUTEROL SULFATE 90 UG/1
1-2 AEROSOL, METERED RESPIRATORY (INHALATION)
Status: CANCELLED
Start: 2023-11-15

## 2023-11-13 RX ORDER — DIPHENHYDRAMINE HYDROCHLORIDE 50 MG/ML
50 INJECTION INTRAMUSCULAR; INTRAVENOUS
Status: CANCELLED
Start: 2023-11-15

## 2023-11-13 RX ORDER — ONDANSETRON 2 MG/ML
8 INJECTION INTRAMUSCULAR; INTRAVENOUS ONCE
Status: CANCELLED | OUTPATIENT
Start: 2023-11-15

## 2023-11-13 RX ORDER — MEPERIDINE HYDROCHLORIDE 25 MG/ML
25 INJECTION INTRAMUSCULAR; INTRAVENOUS; SUBCUTANEOUS EVERY 30 MIN PRN
Status: CANCELLED | OUTPATIENT
Start: 2023-11-15

## 2023-11-13 RX ORDER — ALBUTEROL SULFATE 0.83 MG/ML
2.5 SOLUTION RESPIRATORY (INHALATION)
Status: CANCELLED | OUTPATIENT
Start: 2023-11-15

## 2023-11-15 ENCOUNTER — INFUSION THERAPY VISIT (OUTPATIENT)
Dept: INFUSION THERAPY | Facility: CLINIC | Age: 53
End: 2023-11-15
Attending: NURSE PRACTITIONER
Payer: COMMERCIAL

## 2023-11-15 ENCOUNTER — ONCOLOGY VISIT (OUTPATIENT)
Dept: ONCOLOGY | Facility: CLINIC | Age: 53
End: 2023-11-15
Attending: NURSE PRACTITIONER
Payer: COMMERCIAL

## 2023-11-15 VITALS
HEIGHT: 66 IN | DIASTOLIC BLOOD PRESSURE: 68 MMHG | RESPIRATION RATE: 16 BRPM | TEMPERATURE: 98.5 F | WEIGHT: 160 LBS | SYSTOLIC BLOOD PRESSURE: 106 MMHG | OXYGEN SATURATION: 100 % | BODY MASS INDEX: 25.71 KG/M2 | HEART RATE: 69 BPM

## 2023-11-15 VITALS — SYSTOLIC BLOOD PRESSURE: 126 MMHG | DIASTOLIC BLOOD PRESSURE: 70 MMHG | HEART RATE: 71 BPM | RESPIRATION RATE: 16 BRPM

## 2023-11-15 DIAGNOSIS — C18.7 MALIGNANT NEOPLASM OF SIGMOID COLON (H): Primary | ICD-10-CM

## 2023-11-15 DIAGNOSIS — D50.0 IRON DEFICIENCY ANEMIA DUE TO CHRONIC BLOOD LOSS: ICD-10-CM

## 2023-11-15 LAB
ALBUMIN SERPL BCG-MCNC: 3.9 G/DL (ref 3.5–5.2)
ALP SERPL-CCNC: 57 U/L (ref 40–150)
ALT SERPL W P-5'-P-CCNC: 36 U/L (ref 0–50)
ANION GAP SERPL CALCULATED.3IONS-SCNC: 11 MMOL/L (ref 7–15)
AST SERPL W P-5'-P-CCNC: 40 U/L (ref 0–45)
BASOPHILS # BLD AUTO: 0 10E3/UL (ref 0–0.2)
BASOPHILS NFR BLD AUTO: 1 %
BILIRUB SERPL-MCNC: 0.3 MG/DL
BUN SERPL-MCNC: 9.5 MG/DL (ref 6–20)
CALCIUM SERPL-MCNC: 9.2 MG/DL (ref 8.6–10)
CHLORIDE SERPL-SCNC: 103 MMOL/L (ref 98–107)
CREAT SERPL-MCNC: 0.69 MG/DL (ref 0.51–0.95)
DEPRECATED HCO3 PLAS-SCNC: 24 MMOL/L (ref 22–29)
EGFRCR SERPLBLD CKD-EPI 2021: >90 ML/MIN/1.73M2
EOSINOPHIL # BLD AUTO: 0.1 10E3/UL (ref 0–0.7)
EOSINOPHIL NFR BLD AUTO: 2 %
ERYTHROCYTE [DISTWIDTH] IN BLOOD BY AUTOMATED COUNT: 33 % (ref 10–15)
GLUCOSE SERPL-MCNC: 90 MG/DL (ref 70–99)
HCT VFR BLD AUTO: 30.6 % (ref 35–47)
HGB BLD-MCNC: 9.8 G/DL (ref 11.7–15.7)
IMM GRANULOCYTES # BLD: 0 10E3/UL
IMM GRANULOCYTES NFR BLD: 1 %
LYMPHOCYTES # BLD AUTO: 1.6 10E3/UL (ref 0.8–5.3)
LYMPHOCYTES NFR BLD AUTO: 32 %
MCH RBC QN AUTO: 25.8 PG (ref 26.5–33)
MCHC RBC AUTO-ENTMCNC: 32 G/DL (ref 31.5–36.5)
MCV RBC AUTO: 81 FL (ref 78–100)
MONOCYTES # BLD AUTO: 0.7 10E3/UL (ref 0–1.3)
MONOCYTES NFR BLD AUTO: 15 %
NEUTROPHILS # BLD AUTO: 2.5 10E3/UL (ref 1.6–8.3)
NEUTROPHILS NFR BLD AUTO: 49 %
NRBC # BLD AUTO: 0 10E3/UL
NRBC BLD AUTO-RTO: 0 /100
PLATELET # BLD AUTO: 150 10E3/UL (ref 150–450)
POTASSIUM SERPL-SCNC: 3.8 MMOL/L (ref 3.4–5.3)
PROT SERPL-MCNC: 6.7 G/DL (ref 6.4–8.3)
RBC # BLD AUTO: 3.8 10E6/UL (ref 3.8–5.2)
SODIUM SERPL-SCNC: 138 MMOL/L (ref 135–145)
WBC # BLD AUTO: 5 10E3/UL (ref 4–11)

## 2023-11-15 PROCEDURE — 96367 TX/PROPH/DG ADDL SEQ IV INF: CPT

## 2023-11-15 PROCEDURE — 258N000003 HC RX IP 258 OP 636: Performed by: INTERNAL MEDICINE

## 2023-11-15 PROCEDURE — 96415 CHEMO IV INFUSION ADDL HR: CPT

## 2023-11-15 PROCEDURE — 85025 COMPLETE CBC W/AUTO DIFF WBC: CPT | Performed by: INTERNAL MEDICINE

## 2023-11-15 PROCEDURE — 80053 COMPREHEN METABOLIC PANEL: CPT | Performed by: INTERNAL MEDICINE

## 2023-11-15 PROCEDURE — 99214 OFFICE O/P EST MOD 30 MIN: CPT | Performed by: NURSE PRACTITIONER

## 2023-11-15 PROCEDURE — 99213 OFFICE O/P EST LOW 20 MIN: CPT | Mod: 25 | Performed by: NURSE PRACTITIONER

## 2023-11-15 PROCEDURE — 250N000011 HC RX IP 250 OP 636: Mod: JZ | Performed by: INTERNAL MEDICINE

## 2023-11-15 PROCEDURE — 96413 CHEMO IV INFUSION 1 HR: CPT

## 2023-11-15 PROCEDURE — 96375 TX/PRO/DX INJ NEW DRUG ADDON: CPT

## 2023-11-15 PROCEDURE — 36415 COLL VENOUS BLD VENIPUNCTURE: CPT | Performed by: INTERNAL MEDICINE

## 2023-11-15 RX ORDER — ONDANSETRON 2 MG/ML
8 INJECTION INTRAMUSCULAR; INTRAVENOUS ONCE
Status: COMPLETED | OUTPATIENT
Start: 2023-11-15 | End: 2023-11-15

## 2023-11-15 RX ORDER — HEPARIN SODIUM (PORCINE) LOCK FLUSH IV SOLN 100 UNIT/ML 100 UNIT/ML
5 SOLUTION INTRAVENOUS
Status: DISCONTINUED | OUTPATIENT
Start: 2023-11-15 | End: 2023-11-15 | Stop reason: HOSPADM

## 2023-11-15 RX ADMIN — OXALIPLATIN 250 MG: 100 INJECTION, SOLUTION, CONCENTRATE INTRAVENOUS at 10:33

## 2023-11-15 RX ADMIN — DEXAMETHASONE SODIUM PHOSPHATE: 10 INJECTION, SOLUTION INTRAMUSCULAR; INTRAVENOUS at 09:44

## 2023-11-15 RX ADMIN — Medication 5 ML: at 12:35

## 2023-11-15 RX ADMIN — ONDANSETRON 8 MG: 2 INJECTION INTRAMUSCULAR; INTRAVENOUS at 09:40

## 2023-11-15 RX ADMIN — DEXTROSE MONOHYDRATE 250 ML: 50 INJECTION, SOLUTION INTRAVENOUS at 10:17

## 2023-11-15 RX ADMIN — FAMOTIDINE 20 MG: 10 INJECTION INTRAVENOUS at 09:42

## 2023-11-15 ASSESSMENT — PAIN SCALES - GENERAL: PAINLEVEL: NO PAIN (0)

## 2023-11-15 NOTE — LETTER
11/15/2023         RE: Shaylee Jackson  [address] [place] [address]        Dear Colleague,    Thank you for referring your patient, Shaylee Jackson, to the [facility] [place]. Please see a copy of my visit note below.    [facility] Hematology and Oncology Progress Note    Patient: Shaylee Jackson  MRN: [id number]  11/15/23        Reason for Visit    Resected stage IIIb colon cancer, on adjuvant chemotherapy    Primary oncologist: Dr. Cohn    Assessment and Plan  Resected stage IIIB (pT3, pN1b, cM0) sigmoid colon cancer  MMR proteins intact  No high risk features except for tumor deposit (1)  Shaylee has now completed 2 cycles of adjuvant CapeOx.  Tolerating this quite well with manageable side effects.    Labwork: Hemoglobin 9.8 (stable/improving), rest CBC normal.  CMP normal.    Plan:  -Proceed with cycle 3 oxaliplatin and Xeloda 2 weeks on/1 week off.  No dose modifications required.  -Return in 3 weeks ahead of cycle 4.  This will be last planned cycle.  -Surveillance CT cap, CEA in ~6 weeks, following last cycle of chemo. From there, will see every 3 months with labs (including CEA) and CT every 6 months for 2-3 yrs, then labs/exam every 6 months and annual CT until 5 years from resection.   -Has post-surgical colonoscopy 1/2024 per CRS recommendations (wanted a khanna assessment of colon since not completely visualized at diagnosis due to tumor obstruction)    Iron deficiency anemia  In Sept, hgb 7.6, MCV 70, ferritin 6. She got IV Venofer x 3 in October.   Hemoglobin is 9.8, MCV now WNL - improving.    Plan:  -Monitor, repeat ferritin and iron indices with labs in 3 weeks.  -Can start oral iron or repeat IV iron as needed; expectant improvement after treating the colon cancer and post-op recovery    Oncology history   Cancer Staging   Malignant neoplasm of sigmoid colon (H)  Staging form: Colon and Rectum, AJCC 8th Edition  - Pathologic: Stage IIIB  (pT3, pN1b, cM0) - Signed by Tuan Cohn MD on 10/12/2023    8/2023: Stage IIIB (T3-[id number]-kE9) colon cancer (low risk)  -Screening colonoscopy: fungating polypoid ulcerated obstructing mass in the sigmoid colon located at 30 cm from the anal verge. Biopsy: high-grade dysplasia without obvious evidence of invasive cancer.  MMR proteins were intact.     -CEA : elevated at 6.4.    -CT chest abdomen pelvis:  circumferential thickening sigmoid colon wall reflecting known possible malignancy. Multiple conspicuous inferior mesenteric lymph nodes concerning for metastatic disease.  Two indeterminate hepatic lesions measuring up to 1.1 cm.    -MRI liver:  simple cysts. No evidence of any metastatic disease.    -Surgical path (sigmoidectomy): 6.3 cm moderately differentiated adenocarcinoma of the sigmoid colon; invades muscularis propria into the pericolonic tissue.  No evidence of tumor perforation.  No LVI.  No perineural invasion.  All margins negative.  3/64 lymph nodes removed were positive for metastatic disease; 1 tumor deposit present.   -post-op CEA: <0.6    Treatment:  -8/24/2023: laparoscopic sigmoidectomy    -10/5/2023 -present: adjuvant CapeOx (3 months/4 cycles planned)      Interval History   Shaylee Jackson is a 53 year old female with resected  stage III colon cancer (low risk) who is currently on adjuvant chemotherapy with Xeloda and oxaliplatin.  Returns ahead of cycle 3.    Tolerated her last cycle generally well .  Minimal significant side effects lasting 1-1.5 weeks were a bit more notable with cycle 2.    Food aversions, eating/drinking well. Forces herself to eat. No nausea. Intermittent constipation, colace as needed sufficient. No blood in stools.   Expected oxaliplatin-induced cold sensitivity, lasted longer this past cycle.    No peripheral neuropathy.  No fevers.    ECOG Performance  0 - Independent    Physical Exam    General: alert and cooperative.  Daughter accompanies.  HEENT: Head:  Normal, normocephalic, atraumatic.  Eye: Normal external eye, conjunctiva, lids cornea, BHASKAR.  Chest: Clear to auscultation bilaterally  Cardiac: RRR  Abdomen: abdomen is soft without significant tenderness, masses, organomegaly or guarding  Extremities: atraumatic, no peripheral edema  Skin:   CNS: Alert and oriented x3, neurologic exam grossly normal.  Lymphatics: No bilateral cervical nor supraclavicular adenopathy noted    Lab Results    Recent Results (from the past 168 hour(s))   Comprehensive metabolic panel   Result Value Ref Range    Sodium 138 135 - 145 mmol/L    Potassium 3.8 3.4 - 5.3 mmol/L    Carbon Dioxide (CO2) 24 22 - 29 mmol/L    Anion Gap 11 7 - 15 mmol/L    Urea Nitrogen 9.5 6.0 - 20.0 mg/dL    Creatinine 0.69 0.51 - 0.95 mg/dL    GFR Estimate >90 >60 mL/min/1.73m2    Calcium 9.2 8.6 - 10.0 mg/dL    Chloride 103 98 - 107 mmol/L    Glucose 90 70 - 99 mg/dL    Alkaline Phosphatase 57 40 - 150 U/L    AST 40 0 - 45 U/L    ALT 36 0 - 50 U/L    Protein Total 6.7 6.4 - 8.3 g/dL    Albumin 3.9 3.5 - 5.2 g/dL    Bilirubin Total 0.3 <=1.2 mg/dL   CBC with platelets and differential   Result Value Ref Range    WBC Count 5.0 4.0 - 11.0 10e3/uL    RBC Count 3.80 3.80 - 5.20 10e6/uL    Hemoglobin 9.8 (L) 11.7 - 15.7 g/dL    Hematocrit 30.6 (L) 35.0 - 47.0 %    MCV 81 78 - 100 fL    MCH 25.8 (L) 26.5 - 33.0 pg    MCHC 32.0 31.5 - 36.5 g/dL    RDW 33.0 (H) 10.0 - 15.0 %    Platelet Count 150 150 - 450 10e3/uL    % Neutrophils 49 %    % Lymphocytes 32 %    % Monocytes 15 %    % Eosinophils 2 %    % Basophils 1 %    % Immature Granulocytes 1 %    NRBCs per 100 WBC 0 <1 /100    Absolute Neutrophils 2.5 1.6 - 8.3 10e3/uL    Absolute Lymphocytes 1.6 0.8 - 5.3 10e3/uL    Absolute Monocytes 0.7 0.0 - 1.3 10e3/uL    Absolute Eosinophils 0.1 0.0 - 0.7 10e3/uL    Absolute Basophils 0.0 0.0 - 0.2 10e3/uL    Absolute Immature Granulocytes 0.0 <=0.4 10e3/uL    Absolute NRBCs 0.0 10e3/uL         Imaging    No results  "found.    A total of 35 min were spent today on this visit which included face to face conversation with the patient, EMR review, counseling and co-ordination of care and medical documentation.    Signed by: Aydee Marc NP      Oncology Rooming Note    November 15, 2023 9:10 AM   Shaylee Jackson is a 53 year old female who presents for:    Chief Complaint   Patient presents with     Oncology Clinic Visit     Malignant neoplasm of sigmoid colon - Labs provider and infusion     Initial Vitals: /68 (BP Location: Right arm, Patient Position: Sitting, Cuff Size: Adult Regular)   Pulse 69   Temp 98.5  F (36.9  C) (Tympanic)   Resp 16   Ht 1.676 m (5' 6\")   Wt 72.6 kg (160 lb)   SpO2 100%   BMI 25.82 kg/m   Estimated body mass index is 25.82 kg/m  as calculated from the following:    Height as of this encounter: 1.676 m (5' 6\").    Weight as of this encounter: 72.6 kg (160 lb). Body surface area is 1.84 meters squared.  No Pain (0) Comment: Data Unavailable   No LMP recorded.  Allergies reviewed: Yes  Medications reviewed: Yes    Medications: Medication refills not needed today.  Pharmacy name entered into [facility]:    [id number] [place], [place] - [address] AT [facility]  [id number]  [facility] - [place], [place] - [address] [facility] [place], [place] - [address] [facility]    Clinical concerns:  None      Veronica Villatoro [facility]                Again, thank you for allowing me to participate in the care of your patient.        Sincerely,        Aydee Marc NP  "

## 2023-11-15 NOTE — PROGRESS NOTES
"Oncology Rooming Note    November 15, 2023 9:10 AM   Shaylee Jackson is a 53 year old female who presents for:    Chief Complaint   Patient presents with    Oncology Clinic Visit     Malignant neoplasm of sigmoid colon - Labs provider and infusion     Initial Vitals: /68 (BP Location: Right arm, Patient Position: Sitting, Cuff Size: Adult Regular)   Pulse 69   Temp 98.5  F (36.9  C) (Tympanic)   Resp 16   Ht 1.676 m (5' 6\")   Wt 72.6 kg (160 lb)   SpO2 100%   BMI 25.82 kg/m   Estimated body mass index is 25.82 kg/m  as calculated from the following:    Height as of this encounter: 1.676 m (5' 6\").    Weight as of this encounter: 72.6 kg (160 lb). Body surface area is 1.84 meters squared.  No Pain (0) Comment: Data Unavailable   No LMP recorded.  Allergies reviewed: Yes  Medications reviewed: Yes    Medications: Medication refills not needed today.  Pharmacy name entered into [facility]:    [id number] - [place], [place] - [address] [facility] [address]  [facility] - [place], [place] - [address] [facility] [place], [place] - [address] [facility]    Clinical concerns:  None      Veronica Villatoro CMA              "

## 2023-11-15 NOTE — PROGRESS NOTES
[facility] Hematology and Oncology Progress Note    Patient: Shaylee Jackson  MRN: [id number]  11/15/23        Reason for Visit    Resected stage IIIb colon cancer, on adjuvant chemotherapy    Primary oncologist: Dr. Cohn    Assessment and Plan  Resected stage IIIB (pT3, pN1b, cM0) sigmoid colon cancer  MMR proteins intact  No high risk features except for tumor deposit (1)  Shaylee has now completed 2 cycles of adjuvant CapeOx.  Tolerating this quite well with manageable side effects.    Labwork: Hemoglobin 9.8 (stable/improving), rest CBC normal.  CMP normal.    Plan:  -Proceed with cycle 3 oxaliplatin and Xeloda 2 weeks on/1 week off.  No dose modifications required.  -Return in 3 weeks ahead of cycle 4.  This will be last planned cycle.  -Surveillance CT cap, CEA in ~6 weeks, following last cycle of chemo. From there, will see every 3 months with labs (including CEA) and CT every 6 months for 2-3 yrs, then labs/exam every 6 months and annual CT until 5 years from resection.   -Has post-surgical colonoscopy 1/2024 per CRS recommendations (wanted a khanna assessment of colon since not completely visualized at diagnosis due to tumor obstruction)    Iron deficiency anemia  In Sept, hgb 7.6, MCV 70, ferritin 6. She got IV Venofer x 3 in October.   Hemoglobin is 9.8, MCV now WNL - improving.    Plan:  -Monitor, repeat ferritin and iron indices with labs in 3 weeks.  -Can start oral iron or repeat IV iron as needed; expectant improvement after treating the colon cancer and post-op recovery    Oncology history   Cancer Staging   Malignant neoplasm of sigmoid colon (H)  Staging form: Colon and Rectum, AJCC 8th Edition  - Pathologic: Stage IIIB (pT3, pN1b, cM0) - Signed by Tuan Cohn MD on 10/12/2023    8/2023: Stage IIIB (T3-tT5s-mP8) colon cancer (low risk)  -Screening colonoscopy: fungating polypoid ulcerated obstructing mass in the sigmoid colon located at 30 cm from the anal verge. Biopsy:  high-grade dysplasia without obvious evidence of invasive cancer.  MMR proteins were intact.     -CEA : elevated at 6.4.    -CT chest abdomen pelvis:  circumferential thickening sigmoid colon wall reflecting known possible malignancy. Multiple conspicuous inferior mesenteric lymph nodes concerning for metastatic disease.  Two indeterminate hepatic lesions measuring up to 1.1 cm.    -MRI liver:  simple cysts. No evidence of any metastatic disease.    -Surgical path (sigmoidectomy): 6.3 cm moderately differentiated adenocarcinoma of the sigmoid colon; invades muscularis propria into the pericolonic tissue.  No evidence of tumor perforation.  No LVI.  No perineural invasion.  All margins negative.  3/64 lymph nodes removed were positive for metastatic disease; 1 tumor deposit present.   -post-op CEA: <0.6    Treatment:  -8/24/2023: laparoscopic sigmoidectomy    -10/5/2023 -present: adjuvant CapeOx (3 months/4 cycles planned)      Interval History   Shaylee Jackson is a 53 year old female with resected  stage III colon cancer (low risk) who is currently on adjuvant chemotherapy with Xeloda and oxaliplatin.  Returns ahead of cycle 3.    Tolerated her last cycle generally well .  Minimal significant side effects lasting 1-1.5 weeks were a bit more notable with cycle 2.    Food aversions, eating/drinking well. Forces herself to eat. No nausea. Intermittent constipation, colace as needed sufficient. No blood in stools.   Expected oxaliplatin-induced cold sensitivity, lasted longer this past cycle.    No peripheral neuropathy.  No fevers.    ECOG Performance  0 - Independent    Physical Exam    General: alert and cooperative.  Daughter accompanies.  HEENT: Head: Normal, normocephalic, atraumatic.  Eye: Normal external eye, conjunctiva, lids cornea, BHASKAR.  Chest: Clear to auscultation bilaterally  Cardiac: RRR  Abdomen: abdomen is soft without significant tenderness, masses, organomegaly or guarding  Extremities:  atraumatic, no peripheral edema  Skin:   CNS: Alert and oriented x3, neurologic exam grossly normal.  Lymphatics: No bilateral cervical nor supraclavicular adenopathy noted    Lab Results    Recent Results (from the past 168 hour(s))   Comprehensive metabolic panel   Result Value Ref Range    Sodium 138 135 - 145 mmol/L    Potassium 3.8 3.4 - 5.3 mmol/L    Carbon Dioxide (CO2) 24 22 - 29 mmol/L    Anion Gap 11 7 - 15 mmol/L    Urea Nitrogen 9.5 6.0 - 20.0 mg/dL    Creatinine 0.69 0.51 - 0.95 mg/dL    GFR Estimate >90 >60 mL/min/1.73m2    Calcium 9.2 8.6 - 10.0 mg/dL    Chloride 103 98 - 107 mmol/L    Glucose 90 70 - 99 mg/dL    Alkaline Phosphatase 57 40 - 150 U/L    AST 40 0 - 45 U/L    ALT 36 0 - 50 U/L    Protein Total 6.7 6.4 - 8.3 g/dL    Albumin 3.9 3.5 - 5.2 g/dL    Bilirubin Total 0.3 <=1.2 mg/dL   CBC with platelets and differential   Result Value Ref Range    WBC Count 5.0 4.0 - 11.0 10e3/uL    RBC Count 3.80 3.80 - 5.20 10e6/uL    Hemoglobin 9.8 (L) 11.7 - 15.7 g/dL    Hematocrit 30.6 (L) 35.0 - 47.0 %    MCV 81 78 - 100 fL    MCH 25.8 (L) 26.5 - 33.0 pg    MCHC 32.0 31.5 - 36.5 g/dL    RDW 33.0 (H) 10.0 - 15.0 %    Platelet Count 150 150 - 450 10e3/uL    % Neutrophils 49 %    % Lymphocytes 32 %    % Monocytes 15 %    % Eosinophils 2 %    % Basophils 1 %    % Immature Granulocytes 1 %    NRBCs per 100 WBC 0 <1 /100    Absolute Neutrophils 2.5 1.6 - 8.3 10e3/uL    Absolute Lymphocytes 1.6 0.8 - 5.3 10e3/uL    Absolute Monocytes 0.7 0.0 - 1.3 10e3/uL    Absolute Eosinophils 0.1 0.0 - 0.7 10e3/uL    Absolute Basophils 0.0 0.0 - 0.2 10e3/uL    Absolute Immature Granulocytes 0.0 <=0.4 10e3/uL    Absolute NRBCs 0.0 10e3/uL         Imaging    No results found.    A total of 35 min were spent today on this visit which included face to face conversation with the patient, EMR review, counseling and co-ordination of care and medical documentation.    Signed by: Aydee Marc NP

## 2023-11-15 NOTE — PROGRESS NOTES
Infusion Nursing Note:  Shaylee Jackson presents today for C3 D1 Oxaliplatin.    Patient seen by provider today: yes Aydee Marc NP   present during visit today: Not Applicable.    Note: Pt denies any new health changes or concerns.      Intravenous Access:  Implanted Port.    Treatment Conditions:  Lab Results   Component Value Date    HGB 9.8 (L) 11/15/2023    WBC 5.0 11/15/2023    ANEUTAUTO 2.5 11/15/2023     11/15/2023        Lab Results   Component Value Date     11/15/2023    POTASSIUM 3.8 11/15/2023    CR 0.69 11/15/2023    SLIM 9.2 11/15/2023    BILITOTAL 0.3 11/15/2023    ALBUMIN 3.9 11/15/2023    ALT 36 11/15/2023    AST 40 11/15/2023       Results reviewed, labs MET treatment parameters, ok to proceed with treatment.      Post Infusion Assessment:  Patient tolerated infusion without incident.  Blood return noted pre and post infusion.  Site patent and intact, free from redness, edema or discomfort.  No evidence of extravasations.  Access discontinued per protocol.       Discharge Plan:   Discharge instructions reviewed with: Patient.  Patient and/or family verbalized understanding of discharge instructions and all questions answered.  Patient discharged in stable condition accompanied by: self.  Departure Mode: Ambulatory.      Brittany White RN

## 2023-12-01 DIAGNOSIS — C18.7 MALIGNANT NEOPLASM OF SIGMOID COLON (H): ICD-10-CM

## 2023-12-01 DIAGNOSIS — C18.7 MALIGNANT NEOPLASM OF SIGMOID COLON (H): Primary | ICD-10-CM

## 2023-12-01 RX ORDER — LORAZEPAM 2 MG/ML
0.5 INJECTION INTRAMUSCULAR EVERY 4 HOURS PRN
Status: CANCELLED | OUTPATIENT
Start: 2023-12-06

## 2023-12-01 RX ORDER — MEPERIDINE HYDROCHLORIDE 25 MG/ML
25 INJECTION INTRAMUSCULAR; INTRAVENOUS; SUBCUTANEOUS EVERY 30 MIN PRN
Status: CANCELLED | OUTPATIENT
Start: 2023-12-06

## 2023-12-01 RX ORDER — EPINEPHRINE 1 MG/ML
0.3 INJECTION, SOLUTION INTRAMUSCULAR; SUBCUTANEOUS EVERY 5 MIN PRN
Status: CANCELLED | OUTPATIENT
Start: 2023-12-06

## 2023-12-01 RX ORDER — METHYLPREDNISOLONE SODIUM SUCCINATE 125 MG/2ML
125 INJECTION, POWDER, LYOPHILIZED, FOR SOLUTION INTRAMUSCULAR; INTRAVENOUS
Status: CANCELLED
Start: 2023-12-06

## 2023-12-01 RX ORDER — CAPECITABINE 500 MG/1
1000 TABLET, FILM COATED ORAL 2 TIMES DAILY
Qty: 112 TABLET | Refills: 0 | Status: SHIPPED | OUTPATIENT
Start: 2023-12-06 | End: 2024-02-22

## 2023-12-01 RX ORDER — DIPHENHYDRAMINE HYDROCHLORIDE 50 MG/ML
50 INJECTION INTRAMUSCULAR; INTRAVENOUS
Status: CANCELLED
Start: 2023-12-06

## 2023-12-01 RX ORDER — ONDANSETRON 2 MG/ML
8 INJECTION INTRAMUSCULAR; INTRAVENOUS ONCE
Status: CANCELLED | OUTPATIENT
Start: 2023-12-06

## 2023-12-01 RX ORDER — ALBUTEROL SULFATE 0.83 MG/ML
2.5 SOLUTION RESPIRATORY (INHALATION)
Status: CANCELLED | OUTPATIENT
Start: 2023-12-06

## 2023-12-01 RX ORDER — DEXAMETHASONE 4 MG/1
8 TABLET ORAL DAILY
Qty: 4 TABLET | Refills: 2 | Status: SHIPPED | OUTPATIENT
Start: 2023-12-01 | End: 2024-02-22

## 2023-12-01 RX ORDER — ALBUTEROL SULFATE 90 UG/1
1-2 AEROSOL, METERED RESPIRATORY (INHALATION)
Status: CANCELLED
Start: 2023-12-06

## 2023-12-01 RX ORDER — ONDANSETRON 8 MG/1
8 TABLET, FILM COATED ORAL EVERY 8 HOURS PRN
Qty: 30 TABLET | Refills: 2 | Status: SHIPPED | OUTPATIENT
Start: 2023-12-01 | End: 2024-02-22

## 2023-12-01 RX ORDER — HEPARIN SODIUM (PORCINE) LOCK FLUSH IV SOLN 100 UNIT/ML 100 UNIT/ML
5 SOLUTION INTRAVENOUS
Status: CANCELLED | OUTPATIENT
Start: 2023-12-06

## 2023-12-03 ENCOUNTER — HEALTH MAINTENANCE LETTER (OUTPATIENT)
Age: 53
End: 2023-12-03

## 2023-12-06 ENCOUNTER — LAB (OUTPATIENT)
Dept: INFUSION THERAPY | Facility: CLINIC | Age: 53
End: 2023-12-06
Attending: NURSE PRACTITIONER
Payer: COMMERCIAL

## 2023-12-06 ENCOUNTER — ONCOLOGY VISIT (OUTPATIENT)
Dept: ONCOLOGY | Facility: CLINIC | Age: 53
End: 2023-12-06
Attending: NURSE PRACTITIONER
Payer: COMMERCIAL

## 2023-12-06 VITALS — DIASTOLIC BLOOD PRESSURE: 73 MMHG | SYSTOLIC BLOOD PRESSURE: 143 MMHG | HEART RATE: 67 BPM

## 2023-12-06 VITALS
DIASTOLIC BLOOD PRESSURE: 63 MMHG | HEIGHT: 66 IN | SYSTOLIC BLOOD PRESSURE: 115 MMHG | RESPIRATION RATE: 12 BRPM | BODY MASS INDEX: 25.23 KG/M2 | WEIGHT: 157 LBS | OXYGEN SATURATION: 100 % | TEMPERATURE: 98.8 F | HEART RATE: 75 BPM

## 2023-12-06 DIAGNOSIS — C18.7 MALIGNANT NEOPLASM OF SIGMOID COLON (H): Primary | ICD-10-CM

## 2023-12-06 DIAGNOSIS — D50.0 IRON DEFICIENCY ANEMIA DUE TO CHRONIC BLOOD LOSS: ICD-10-CM

## 2023-12-06 LAB
ALBUMIN SERPL BCG-MCNC: 4 G/DL (ref 3.5–5.2)
ALP SERPL-CCNC: 64 U/L (ref 40–150)
ALT SERPL W P-5'-P-CCNC: 24 U/L (ref 0–50)
ANION GAP SERPL CALCULATED.3IONS-SCNC: 10 MMOL/L (ref 7–15)
AST SERPL W P-5'-P-CCNC: 37 U/L (ref 0–45)
BASOPHILS # BLD AUTO: 0 10E3/UL (ref 0–0.2)
BASOPHILS NFR BLD AUTO: 0 %
BILIRUB SERPL-MCNC: 0.3 MG/DL
BUN SERPL-MCNC: 10.6 MG/DL (ref 6–20)
CALCIUM SERPL-MCNC: 9.1 MG/DL (ref 8.6–10)
CHLORIDE SERPL-SCNC: 104 MMOL/L (ref 98–107)
CREAT SERPL-MCNC: 0.62 MG/DL (ref 0.51–0.95)
DEPRECATED HCO3 PLAS-SCNC: 26 MMOL/L (ref 22–29)
EGFRCR SERPLBLD CKD-EPI 2021: >90 ML/MIN/1.73M2
EOSINOPHIL # BLD AUTO: 0.1 10E3/UL (ref 0–0.7)
EOSINOPHIL NFR BLD AUTO: 3 %
ERYTHROCYTE [DISTWIDTH] IN BLOOD BY AUTOMATED COUNT: 35.2 % (ref 10–15)
FERRITIN SERPL-MCNC: 269 NG/ML (ref 11–328)
GLUCOSE SERPL-MCNC: 91 MG/DL (ref 70–99)
HCT VFR BLD AUTO: 28.6 % (ref 35–47)
HGB BLD-MCNC: 9.4 G/DL (ref 11.7–15.7)
IMM GRANULOCYTES # BLD: 0 10E3/UL
IMM GRANULOCYTES NFR BLD: 1 %
IRON BINDING CAPACITY (ROCHE): 358 UG/DL (ref 240–430)
IRON SATN MFR SERPL: 15 % (ref 15–46)
IRON SERPL-MCNC: 54 UG/DL (ref 37–145)
LYMPHOCYTES # BLD AUTO: 1.4 10E3/UL (ref 0.8–5.3)
LYMPHOCYTES NFR BLD AUTO: 36 %
MCH RBC QN AUTO: 28.7 PG (ref 26.5–33)
MCHC RBC AUTO-ENTMCNC: 32.9 G/DL (ref 31.5–36.5)
MCV RBC AUTO: 88 FL (ref 78–100)
MONOCYTES # BLD AUTO: 0.6 10E3/UL (ref 0–1.3)
MONOCYTES NFR BLD AUTO: 15 %
NEUTROPHILS # BLD AUTO: 1.8 10E3/UL (ref 1.6–8.3)
NEUTROPHILS NFR BLD AUTO: 45 %
NRBC # BLD AUTO: 0 10E3/UL
NRBC BLD AUTO-RTO: 0 /100
PLATELET # BLD AUTO: 106 10E3/UL (ref 150–450)
POTASSIUM SERPL-SCNC: 3.6 MMOL/L (ref 3.4–5.3)
PROT SERPL-MCNC: 6.7 G/DL (ref 6.4–8.3)
RBC # BLD AUTO: 3.27 10E6/UL (ref 3.8–5.2)
SODIUM SERPL-SCNC: 140 MMOL/L (ref 135–145)
WBC # BLD AUTO: 3.9 10E3/UL (ref 4–11)

## 2023-12-06 PROCEDURE — 96367 TX/PROPH/DG ADDL SEQ IV INF: CPT

## 2023-12-06 PROCEDURE — 36415 COLL VENOUS BLD VENIPUNCTURE: CPT | Performed by: NURSE PRACTITIONER

## 2023-12-06 PROCEDURE — 258N000003 HC RX IP 258 OP 636: Performed by: INTERNAL MEDICINE

## 2023-12-06 PROCEDURE — 99214 OFFICE O/P EST MOD 30 MIN: CPT | Performed by: NURSE PRACTITIONER

## 2023-12-06 PROCEDURE — 99214 OFFICE O/P EST MOD 30 MIN: CPT | Mod: 25 | Performed by: NURSE PRACTITIONER

## 2023-12-06 PROCEDURE — 82728 ASSAY OF FERRITIN: CPT | Performed by: NURSE PRACTITIONER

## 2023-12-06 PROCEDURE — 80053 COMPREHEN METABOLIC PANEL: CPT | Performed by: NURSE PRACTITIONER

## 2023-12-06 PROCEDURE — 85025 COMPLETE CBC W/AUTO DIFF WBC: CPT | Performed by: NURSE PRACTITIONER

## 2023-12-06 PROCEDURE — G0498 CHEMO EXTEND IV INFUS W/PUMP: HCPCS

## 2023-12-06 PROCEDURE — 83550 IRON BINDING TEST: CPT | Performed by: NURSE PRACTITIONER

## 2023-12-06 PROCEDURE — 96375 TX/PRO/DX INJ NEW DRUG ADDON: CPT

## 2023-12-06 PROCEDURE — 96415 CHEMO IV INFUSION ADDL HR: CPT

## 2023-12-06 PROCEDURE — 250N000011 HC RX IP 250 OP 636: Performed by: INTERNAL MEDICINE

## 2023-12-06 PROCEDURE — 96413 CHEMO IV INFUSION 1 HR: CPT

## 2023-12-06 RX ORDER — ONDANSETRON 2 MG/ML
8 INJECTION INTRAMUSCULAR; INTRAVENOUS ONCE
Status: COMPLETED | OUTPATIENT
Start: 2023-12-06 | End: 2023-12-06

## 2023-12-06 RX ORDER — HEPARIN SODIUM (PORCINE) LOCK FLUSH IV SOLN 100 UNIT/ML 100 UNIT/ML
5 SOLUTION INTRAVENOUS
Status: DISCONTINUED | OUTPATIENT
Start: 2023-12-06 | End: 2023-12-06 | Stop reason: HOSPADM

## 2023-12-06 RX ADMIN — DEXAMETHASONE SODIUM PHOSPHATE: 10 INJECTION, SOLUTION INTRAMUSCULAR; INTRAVENOUS at 11:29

## 2023-12-06 RX ADMIN — FAMOTIDINE 20 MG: 10 INJECTION INTRAVENOUS at 11:07

## 2023-12-06 RX ADMIN — DEXTROSE MONOHYDRATE 250 ML: 50 INJECTION, SOLUTION INTRAVENOUS at 11:59

## 2023-12-06 RX ADMIN — ONDANSETRON 8 MG: 2 INJECTION INTRAMUSCULAR; INTRAVENOUS at 11:03

## 2023-12-06 RX ADMIN — Medication 5 ML: at 14:28

## 2023-12-06 RX ADMIN — OXALIPLATIN 250 MG: 100 INJECTION, SOLUTION, CONCENTRATE INTRAVENOUS at 11:54

## 2023-12-06 ASSESSMENT — PAIN SCALES - GENERAL: PAINLEVEL: NO PAIN (0)

## 2023-12-06 NOTE — PROGRESS NOTES
[facility] Hematology and Oncology Progress Note    Patient: Shaylee Jackson  MRN: [id number]  12/06/23        Reason for Visit    Resected stage IIIb colon cancer, on adjuvant chemotherapy    Primary oncologist: Dr. Cohn    Assessment and Plan  Resected stage IIIB (pT3, pN1b, cM0) sigmoid colon cancer  MMR proteins intact  No high risk features except for tumor deposit (1)  Shaylee has now completed 3 cycles of adjuvant CapeOx.  Tolerating this quite well with manageable side effects.    Labwork: Hemoglobin 9.4 (stable/improving), WBC 3.9, ANC 1.8, plat 104.  CMP normal.    Plan:  -Proceed with cycle 4 oxaliplatin and Xeloda 2 weeks on/1 week off.  No dose modifications required. This is last planned cycle  -Surveillance CT cap, labs (CEA, CBC, CMP) and follow-up in 3 weeks. From there, will see every 3 months with labs (including CEA) and CT every 6 months for 2-3 yrs, then labs/exam every 6 months and annual CT until 5 years from resection.   -Has post-surgical colonoscopy 1/2024 per CRS recommendations (wanted a khanna assessment of colon since not completely visualized at diagnosis due to tumor obstruction)    Iron deficiency anemia  In Sept, hgb 7.6, MCV 70, ferritin 6. She got IV Venofer x 3 in October.   Hemoglobin is 9.4, MCV now WNL - improving. Ferritin and iron indices pending.    Plan:  -Expectant improvement after treating the colon cancer and post-op recovery  -Oral/IV iron replacement, as needed    ADDENDUM:  Ferritin and iron values WNL. Persistent anemia currently chemo effect.     Oncology history   Cancer Staging   Malignant neoplasm of sigmoid colon (H)  Staging form: Colon and Rectum, AJCC 8th Edition  - Pathologic: Stage IIIB (pT3, pN1b, cM0) - Signed by Tuan Cohn MD on 10/12/2023    8/2023: Stage IIIB (T3-bG1b-pA3) colon cancer (low risk)  -Screening colonoscopy: fungating polypoid ulcerated obstructing mass in the sigmoid colon located at 30 cm from the anal verge.  Biopsy: high-grade dysplasia without obvious evidence of invasive cancer.  MMR proteins were intact.     -CEA : elevated at 6.4.    -CT chest abdomen pelvis:  circumferential thickening sigmoid colon wall reflecting known possible malignancy. Multiple conspicuous inferior mesenteric lymph nodes concerning for metastatic disease.  Two indeterminate hepatic lesions measuring up to 1.1 cm.    -MRI liver:  simple cysts. No evidence of any metastatic disease.    -Surgical path (sigmoidectomy): 6.3 cm moderately differentiated adenocarcinoma of the sigmoid colon; invades muscularis propria into the pericolonic tissue.  No evidence of tumor perforation.  No LVI.  No perineural invasion.  All margins negative.  3/64 lymph nodes removed were positive for metastatic disease; 1 tumor deposit present.   -post-op CEA: <0.6    Treatment:  -8/24/2023: laparoscopic sigmoidectomy    -10/5/2023 -12/6/2023: adjuvant CapeOx x 4 cycles      Interval History   Shaylee Jackson is a 53 year old female with resected stage III colon cancer (low risk) who is currently on adjuvant chemotherapy with Xeloda and oxaliplatin.  Returns ahead of cycle 4.    Tolerated her last cycle generally well.  Minimal side effects lasting 1-1.5 weeks were a bit more notable with each cycle, but very manageable. Some food aversions, eating/drinking well. Forces herself to eat. No nausea. Intermittent constipation, colace as needed sufficient. No blood in stools.   Expected oxaliplatin-induced cold sensitivity x 1 week, then resolves. No peripheral neuropathy.  No fevers. No bleeding.    ECOG Performance  0 - Independent    Physical Exam    General: alert and cooperative.  Daughter accompanies.  HEENT: Head: Normal, normocephalic, atraumatic.  Eye: Normal external eye, conjunctiva, lids cornea, BHASKAR.  Chest: Clear to auscultation bilaterally  Cardiac: RRR  Abdomen: abdomen is soft without significant tenderness, masses, organomegaly or guarding  Extremities:  atraumatic, no peripheral edema  Skin:   CNS: Alert and oriented x3, neurologic exam grossly normal.  Lymphatics: No bilateral cervical nor supraclavicular adenopathy noted    Lab Results    Recent Results (from the past 168 hour(s))   Comprehensive metabolic panel   Result Value Ref Range    Sodium 140 135 - 145 mmol/L    Potassium 3.6 3.4 - 5.3 mmol/L    Carbon Dioxide (CO2) 26 22 - 29 mmol/L    Anion Gap 10 7 - 15 mmol/L    Urea Nitrogen 10.6 6.0 - 20.0 mg/dL    Creatinine 0.62 0.51 - 0.95 mg/dL    GFR Estimate >90 >60 mL/min/1.73m2    Calcium 9.1 8.6 - 10.0 mg/dL    Chloride 104 98 - 107 mmol/L    Glucose 91 70 - 99 mg/dL    Alkaline Phosphatase 64 40 - 150 U/L    AST 37 0 - 45 U/L    ALT 24 0 - 50 U/L    Protein Total 6.7 6.4 - 8.3 g/dL    Albumin 4.0 3.5 - 5.2 g/dL    Bilirubin Total 0.3 <=1.2 mg/dL   CBC with platelets and differential   Result Value Ref Range    WBC Count 3.9 (L) 4.0 - 11.0 10e3/uL    RBC Count 3.27 (L) 3.80 - 5.20 10e6/uL    Hemoglobin 9.4 (L) 11.7 - 15.7 g/dL    Hematocrit 28.6 (L) 35.0 - 47.0 %    MCV 88 78 - 100 fL    MCH 28.7 26.5 - 33.0 pg    MCHC 32.9 31.5 - 36.5 g/dL    RDW 35.2 (H) 10.0 - 15.0 %    Platelet Count 106 (L) 150 - 450 10e3/uL    % Neutrophils 45 %    % Lymphocytes 36 %    % Monocytes 15 %    % Eosinophils 3 %    % Basophils 0 %    % Immature Granulocytes 1 %    NRBCs per 100 WBC 0 <1 /100    Absolute Neutrophils 1.8 1.6 - 8.3 10e3/uL    Absolute Lymphocytes 1.4 0.8 - 5.3 10e3/uL    Absolute Monocytes 0.6 0.0 - 1.3 10e3/uL    Absolute Eosinophils 0.1 0.0 - 0.7 10e3/uL    Absolute Basophils 0.0 0.0 - 0.2 10e3/uL    Absolute Immature Granulocytes 0.0 <=0.4 10e3/uL    Absolute NRBCs 0.0 10e3/uL         Imaging    No results found.    A total of 30 min were spent today on this visit which included face to face conversation with the patient, EMR review, counseling and co-ordination of care and medical documentation.    Signed by: Aydee Marc NP

## 2023-12-06 NOTE — PROGRESS NOTES
Port easily accessed  Nicole blood return  Labs drawn  Flushed  Saline locked for provider appt.     Ibeth Traylor RN on 12/6/2023 at 9:31 AM

## 2023-12-06 NOTE — PROGRESS NOTES
Infusion Nursing Note:  Shaylee Jackson presents today for C4D1 Eloxatin.    Patient seen by provider today: Yes: Aydee Marc     Infusion nursing Assessment deferred    present during visit today: Not Applicable.    Note: N/A.      Intravenous Access:  Implanted Port.    Treatment Conditions:  Lab Results   Component Value Date    HGB 9.4 (L) 12/06/2023    WBC 3.9 (L) 12/06/2023    ANEUTAUTO 1.8 12/06/2023     (L) 12/06/2023        Lab Results   Component Value Date     12/06/2023    POTASSIUM 3.6 12/06/2023    CR 0.62 12/06/2023    SLIM 9.1 12/06/2023    BILITOTAL 0.3 12/06/2023    ALBUMIN 4.0 12/06/2023    ALT 24 12/06/2023    AST 37 12/06/2023       Results reviewed, labs MET treatment parameters, ok to proceed with treatment.      Post Infusion Assessment:  Patient tolerated infusion without incident.  Blood return noted pre and post infusion.  Site patent and intact, free from redness, edema or discomfort.  No evidence of extravasations.  Access discontinued per protocol.     Pt reports that she has leg weakness after the infusion. Asked pt to bring this up to provider in the next visit.     Discharge Plan:   Discharge instructions reviewed with: Patient.  Patient and/or family verbalized understanding of discharge instructions and all questions answered.  Patient discharged in stable condition accompanied by: self.  Departure Mode: Ambulatory.      Ibeth Traylor RN

## 2023-12-06 NOTE — LETTER
12/6/2023         RE: Shaylee Jackson  [address] [place] [address]        Dear Colleague,    Thank you for referring your patient, Shaylee Jackson, to the [facility] CANCER [facility]. Please see a copy of my visit note below.    [facility] Hematology and Oncology Progress Note    Patient: Shaylee Jackson  MRN: [id number]  12/06/23        Reason for Visit    Resected stage IIIb colon cancer, on adjuvant chemotherapy    Primary oncologist: Dr. Cohn    Assessment and Plan  Resected stage IIIB (pT3, pN1b, cM0) sigmoid colon cancer  MMR proteins intact  No high risk features except for tumor deposit (1)  Shaylee has now completed 3 cycles of adjuvant CapeOx.  Tolerating this quite well with manageable side effects.    Labwork: Hemoglobin 9.4 (stable/improving), WBC 3.9, ANC 1.8, plat 104.  CMP normal.    Plan:  -Proceed with cycle 4 oxaliplatin and Xeloda 2 weeks on/1 week off.  No dose modifications required. This is last planned cycle  -Surveillance CT cap, labs (CEA, CBC, CMP) and follow-up in 3 weeks. From there, will see every 3 months with labs (including CEA) and CT every 6 months for 2-3 yrs, then labs/exam every 6 months and annual CT until 5 years from resection.   -Has post-surgical colonoscopy 1/2024 per CRS recommendations (wanted a khanna assessment of colon since not completely visualized at diagnosis due to tumor obstruction)    Iron deficiency anemia  In Sept, hgb 7.6, MCV 70, ferritin 6. She got IV Venofer x 3 in October.   Hemoglobin is 9.4, MCV now WNL - improving. Ferritin and iron indices pending.    Plan:  -Expectant improvement after treating the colon cancer and post-op recovery  -Oral/IV iron replacement, as needed    Oncology history   Cancer Staging   Malignant neoplasm of sigmoid colon (H)  Staging form: Colon and Rectum, AJCC 8th Edition  - Pathologic: Stage IIIB (pT3, pN1b, cM0) - Signed by Tuan Cohn MD on 10/12/2023    8/2023:  Stage IIIB (T3-iH7m-uS3) colon cancer (low risk)  -Screening colonoscopy: fungating polypoid ulcerated obstructing mass in the sigmoid colon located at 30 cm from the anal verge. Biopsy: high-grade dysplasia without obvious evidence of invasive cancer.  MMR proteins were intact.     -CEA : elevated at 6.4.    -CT chest abdomen pelvis:  circumferential thickening sigmoid colon wall reflecting known possible malignancy. Multiple conspicuous inferior mesenteric lymph nodes concerning for metastatic disease.  Two indeterminate hepatic lesions measuring up to 1.1 cm.    -MRI liver:  simple cysts. No evidence of any metastatic disease.    -Surgical path (sigmoidectomy): 6.3 cm moderately differentiated adenocarcinoma of the sigmoid colon; invades muscularis propria into the pericolonic tissue.  No evidence of tumor perforation.  No LVI.  No perineural invasion.  All margins negative.  3/64 lymph nodes removed were positive for metastatic disease; 1 tumor deposit present.   -post-op CEA: <0.6    Treatment:  -8/24/2023: laparoscopic sigmoidectomy    -10/5/2023 -12/6/2023: adjuvant CapeOx x 4 cycles      Interval History   Shaylee Jackson is a 53 year old female with resected stage III colon cancer (low risk) who is currently on adjuvant chemotherapy with Xeloda and oxaliplatin.  Returns ahead of cycle 4.    Tolerated her last cycle generally well.  Minimal side effects lasting 1-1.5 weeks were a bit more notable with each cycle, but very manageable. Some food aversions, eating/drinking well. Forces herself to eat. No nausea. Intermittent constipation, colace as needed sufficient. No blood in stools.   Expected oxaliplatin-induced cold sensitivity x 1 week, then resolves. No peripheral neuropathy.  No fevers. No bleeding.    ECOG Performance  0 - Independent    Physical Exam    General: alert and cooperative.  Daughter accompanies.  HEENT: Head: Normal, normocephalic, atraumatic.  Eye: Normal external eye, conjunctiva,  lids cornea, BHASKAR.  Chest: Clear to auscultation bilaterally  Cardiac: RRR  Abdomen: abdomen is soft without significant tenderness, masses, organomegaly or guarding  Extremities: atraumatic, no peripheral edema  Skin:   CNS: Alert and oriented x3, neurologic exam grossly normal.  Lymphatics: No bilateral cervical nor supraclavicular adenopathy noted    Lab Results    Recent Results (from the past 168 hour(s))   Comprehensive metabolic panel   Result Value Ref Range    Sodium 140 135 - 145 mmol/L    Potassium 3.6 3.4 - 5.3 mmol/L    Carbon Dioxide (CO2) 26 22 - 29 mmol/L    Anion Gap 10 7 - 15 mmol/L    Urea Nitrogen 10.6 6.0 - 20.0 mg/dL    Creatinine 0.62 0.51 - 0.95 mg/dL    GFR Estimate >90 >60 mL/min/1.73m2    Calcium 9.1 8.6 - 10.0 mg/dL    Chloride 104 98 - 107 mmol/L    Glucose 91 70 - 99 mg/dL    Alkaline Phosphatase 64 40 - 150 U/L    AST 37 0 - 45 U/L    ALT 24 0 - 50 U/L    Protein Total 6.7 6.4 - 8.3 g/dL    Albumin 4.0 3.5 - 5.2 g/dL    Bilirubin Total 0.3 <=1.2 mg/dL   CBC with platelets and differential   Result Value Ref Range    WBC Count 3.9 (L) 4.0 - 11.0 10e3/uL    RBC Count 3.27 (L) 3.80 - 5.20 10e6/uL    Hemoglobin 9.4 (L) 11.7 - 15.7 g/dL    Hematocrit 28.6 (L) 35.0 - 47.0 %    MCV 88 78 - 100 fL    MCH 28.7 26.5 - 33.0 pg    MCHC 32.9 31.5 - 36.5 g/dL    RDW 35.2 (H) 10.0 - 15.0 %    Platelet Count 106 (L) 150 - 450 10e3/uL    % Neutrophils 45 %    % Lymphocytes 36 %    % Monocytes 15 %    % Eosinophils 3 %    % Basophils 0 %    % Immature Granulocytes 1 %    NRBCs per 100 WBC 0 <1 /100    Absolute Neutrophils 1.8 1.6 - 8.3 10e3/uL    Absolute Lymphocytes 1.4 0.8 - 5.3 10e3/uL    Absolute Monocytes 0.6 0.0 - 1.3 10e3/uL    Absolute Eosinophils 0.1 0.0 - 0.7 10e3/uL    Absolute Basophils 0.0 0.0 - 0.2 10e3/uL    Absolute Immature Granulocytes 0.0 <=0.4 10e3/uL    Absolute NRBCs 0.0 10e3/uL         Imaging    No results found.    A total of 30 min were spent today on this visit which included  "face to face conversation with the patient, EMR review, counseling and co-ordination of care and medical documentation.    Signed by: Aydee Marc NP      Oncology Rooming Note    December 6, 2023 9:43 AM   Shaylee Jackson is a 53 year old female who presents for:    Chief Complaint   Patient presents with     Oncology Clinic Visit     Malignant neoplasm of sigmoid colon - Labs provider and infusion     Initial Vitals: /63 (BP Location: Right arm, Patient Position: Sitting, Cuff Size: Adult Regular)   Pulse 75   Temp 98.8  F (37.1  C) (Tympanic)   Resp 12   Ht 1.676 m (5' 6\")   Wt 71.2 kg (157 lb)   SpO2 100%   BMI 25.34 kg/m   Estimated body mass index is 25.34 kg/m  as calculated from the following:    Height as of this encounter: 1.676 m (5' 6\").    Weight as of this encounter: 71.2 kg (157 lb). Body surface area is 1.82 meters squared.  No Pain (0) Comment: Data Unavailable   No LMP recorded.  Allergies reviewed: Yes  Medications reviewed: Yes    Medications: Medication refills not needed today.  Pharmacy name entered into [facility]:    [id number] [id number] [facility] [address]  [facility] [place] [place] [facility] [address] [facility] [place] [facility] [place] [place] [facility] [address]    Clinical concerns:  None      Veronica Villatoro, [facility]                Again, thank you for allowing me to participate in the care of your patient.        Sincerely,        Aydee Marc NP  "

## 2023-12-06 NOTE — PROGRESS NOTES
"Oncology Rooming Note    December 6, 2023 9:43 AM   Shaylee Jackson is a 53 year old female who presents for:    Chief Complaint   Patient presents with    Oncology Clinic Visit     Malignant neoplasm of sigmoid colon - Labs provider and infusion     Initial Vitals: /63 (BP Location: Right arm, Patient Position: Sitting, Cuff Size: Adult Regular)   Pulse 75   Temp 98.8  F (37.1  C) (Tympanic)   Resp 12   Ht 1.676 m (5' 6\")   Wt 71.2 kg (157 lb)   SpO2 100%   BMI 25.34 kg/m   Estimated body mass index is 25.34 kg/m  as calculated from the following:    Height as of this encounter: 1.676 m (5' 6\").    Weight as of this encounter: 71.2 kg (157 lb). Body surface area is 1.82 meters squared.  No Pain (0) Comment: Data Unavailable   No LMP recorded.  Allergies reviewed: Yes  Medications reviewed: Yes    Medications: Medication refills not needed today.  Pharmacy name entered into [facility]:    [facility][id number] - [place], [place] - [address] [facility] [address]  [facility] - [place], [place] - [address] [facility] [place] [facility] [place], [place] - [address]    Clinical concerns:  None      Veronica Villatoro CMA              "

## 2023-12-27 ENCOUNTER — ANCILLARY PROCEDURE (OUTPATIENT)
Dept: GENERAL RADIOLOGY | Facility: CLINIC | Age: 53
End: 2023-12-27
Payer: COMMERCIAL

## 2023-12-27 ENCOUNTER — ANCILLARY PROCEDURE (OUTPATIENT)
Dept: CT IMAGING | Facility: CLINIC | Age: 53
End: 2023-12-27
Attending: NURSE PRACTITIONER
Payer: COMMERCIAL

## 2023-12-27 DIAGNOSIS — C18.7 MALIGNANT NEOPLASM OF SIGMOID COLON (H): ICD-10-CM

## 2023-12-27 LAB
ALBUMIN SERPL BCG-MCNC: 3.9 G/DL (ref 3.5–5.2)
ALP SERPL-CCNC: 69 U/L (ref 40–150)
ALT SERPL W P-5'-P-CCNC: 21 U/L (ref 0–50)
ANION GAP SERPL CALCULATED.3IONS-SCNC: 10 MMOL/L (ref 7–15)
AST SERPL W P-5'-P-CCNC: 43 U/L (ref 0–45)
BASOPHILS # BLD AUTO: 0 10E3/UL (ref 0–0.2)
BASOPHILS NFR BLD AUTO: 1 %
BILIRUB SERPL-MCNC: 0.6 MG/DL
BUN SERPL-MCNC: 5 MG/DL (ref 6–20)
CALCIUM SERPL-MCNC: 9 MG/DL (ref 8.6–10)
CEA SERPL-MCNC: 0.9 NG/ML
CHLORIDE SERPL-SCNC: 107 MMOL/L (ref 98–107)
CREAT SERPL-MCNC: 0.6 MG/DL (ref 0.51–0.95)
DEPRECATED HCO3 PLAS-SCNC: 24 MMOL/L (ref 22–29)
EGFRCR SERPLBLD CKD-EPI 2021: >90 ML/MIN/1.73M2
EOSINOPHIL # BLD AUTO: 0.2 10E3/UL (ref 0–0.7)
EOSINOPHIL NFR BLD AUTO: 4 %
ERYTHROCYTE [DISTWIDTH] IN BLOOD BY AUTOMATED COUNT: ABNORMAL %
GLUCOSE SERPL-MCNC: 98 MG/DL (ref 70–99)
HCT VFR BLD AUTO: 29.2 % (ref 35–47)
HGB BLD-MCNC: 9.5 G/DL (ref 11.7–15.7)
IMM GRANULOCYTES # BLD: 0 10E3/UL
IMM GRANULOCYTES NFR BLD: 1 %
LYMPHOCYTES # BLD AUTO: 1.5 10E3/UL (ref 0.8–5.3)
LYMPHOCYTES NFR BLD AUTO: 35 %
MCH RBC QN AUTO: 30.4 PG (ref 26.5–33)
MCHC RBC AUTO-ENTMCNC: 32.5 G/DL (ref 31.5–36.5)
MCV RBC AUTO: 94 FL (ref 78–100)
MONOCYTES # BLD AUTO: 0.7 10E3/UL (ref 0–1.3)
MONOCYTES NFR BLD AUTO: 16 %
NEUTROPHILS # BLD AUTO: 1.9 10E3/UL (ref 1.6–8.3)
NEUTROPHILS NFR BLD AUTO: 43 %
NRBC # BLD AUTO: 0 10E3/UL
NRBC BLD AUTO-RTO: 0 /100
PLATELET # BLD AUTO: 83 10E3/UL (ref 150–450)
POTASSIUM SERPL-SCNC: 3.8 MMOL/L (ref 3.4–5.3)
PROT SERPL-MCNC: 6.6 G/DL (ref 6.4–8.3)
RBC # BLD AUTO: 3.12 10E6/UL (ref 3.8–5.2)
SODIUM SERPL-SCNC: 141 MMOL/L (ref 135–145)
WBC # BLD AUTO: 4.2 10E3/UL (ref 4–11)

## 2023-12-27 PROCEDURE — 80053 COMPREHEN METABOLIC PANEL: CPT

## 2023-12-27 PROCEDURE — 74177 CT ABD & PELVIS W/CONTRAST: CPT | Performed by: RADIOLOGY

## 2023-12-27 PROCEDURE — 82378 CARCINOEMBRYONIC ANTIGEN: CPT

## 2023-12-27 PROCEDURE — 85025 COMPLETE CBC W/AUTO DIFF WBC: CPT

## 2023-12-27 PROCEDURE — 71260 CT THORAX DX C+: CPT | Performed by: RADIOLOGY

## 2023-12-27 PROCEDURE — 36591 DRAW BLOOD OFF VENOUS DEVICE: CPT

## 2023-12-27 RX ORDER — HEPARIN SODIUM (PORCINE) LOCK FLUSH IV SOLN 100 UNIT/ML 100 UNIT/ML
5 SOLUTION INTRAVENOUS ONCE
Status: COMPLETED | OUTPATIENT
Start: 2023-12-27 | End: 2023-12-27

## 2023-12-27 RX ORDER — IOPAMIDOL 755 MG/ML
86 INJECTION, SOLUTION INTRAVASCULAR ONCE
Status: COMPLETED | OUTPATIENT
Start: 2023-12-27 | End: 2023-12-27

## 2023-12-27 RX ADMIN — HEPARIN SODIUM (PORCINE) LOCK FLUSH IV SOLN 100 UNIT/ML 5 ML: 100 SOLUTION at 10:45

## 2023-12-27 RX ADMIN — IOPAMIDOL 86 ML: 755 INJECTION, SOLUTION INTRAVASCULAR at 10:10

## 2023-12-27 NOTE — DISCHARGE INSTRUCTIONS

## 2024-01-04 ENCOUNTER — DOCUMENTATION ONLY (OUTPATIENT)
Dept: ONCOLOGY | Facility: CLINIC | Age: 54
End: 2024-01-04

## 2024-01-04 ENCOUNTER — VIRTUAL VISIT (OUTPATIENT)
Dept: ONCOLOGY | Facility: CLINIC | Age: 54
End: 2024-01-04
Attending: INTERNAL MEDICINE
Payer: COMMERCIAL

## 2024-01-04 DIAGNOSIS — D50.0 IRON DEFICIENCY ANEMIA DUE TO CHRONIC BLOOD LOSS: ICD-10-CM

## 2024-01-04 DIAGNOSIS — C18.7 MALIGNANT NEOPLASM OF SIGMOID COLON (H): Primary | ICD-10-CM

## 2024-01-04 PROCEDURE — 99214 OFFICE O/P EST MOD 30 MIN: CPT | Mod: 95 | Performed by: INTERNAL MEDICINE

## 2024-01-04 ASSESSMENT — PAIN SCALES - GENERAL: PAINLEVEL: NO PAIN (0)

## 2024-01-04 NOTE — PROGRESS NOTES
Virtual Visit Details    Type of service:  Video Visit   Video start time: 12:28 PM  Video stop time: 12:38 PM  Originating Location (pt. Location): Home    Distant Location (provider location):  Off-site  Platform used for Video Visit: [facility] Hematology and Oncology Progress Note    Patient: Shaylee Jackson  MRN: [id number]  [birth year]        Reason for Visit    Resected stage IIIb colon cancer, on adjuvant chemotherapy    Primary oncologist: Dr. Cohn    Assessment and Plan  Resected stage IIIB (pT3, pN1b, cM0) sigmoid colon cancer  MMR proteins intact  No high risk features except for tumor deposit (1)  Shaylee has now completed 4 cycles of adjuvant CapeOx.    Tolerated chemotherapy fairly well without any major side effects.  Had a repeat CT scan and labs last week.  Labs show cytopenia with a platelet count of 83,000.  Hemoglobin is low at 9.5.  Normal white count.  Serum chemistries mostly stable.  CEA was 0.9.  Reviewed CT scan images and report personally and independently interpreted the results.  No evidence of any metastatic disease.  Postsurgical changes noted.    Plan is to continue surveillance with periodic labs and physical exam.  Imaging once every 6 months for the first 2 years and then once a year.  I will see him back in 3 months with repeat labs.  CBC, CMP and CEA.      Iron deficiency anemia  Continues to have significant anemia.  Iron studies done recently showed a transferrin saturation of 15%.  I think she is still iron deficient.  She also has had significant menstrual bleeding.  Recommended oral iron either daily or every other day.  If she continues to have significant menstrual bleeding or becomes more symptomatic then I think we should arrange some IV iron infusions.  She is agreeable to the plan.    Oncology history   Cancer Staging   Malignant neoplasm of sigmoid colon (H)  Staging form: Colon and Rectum, AJCC 8th Edition  - Pathologic: Stage IIIB (pT3, pN1b,  cM0) - Signed by Tuan Cohn MD on 10/12/2023    8/2023: Stage IIIB (T3-[id number]) colon cancer (low risk)  -Screening colonoscopy: fungating polypoid ulcerated obstructing mass in the sigmoid colon located at 30 cm from the anal verge. Biopsy: high-grade dysplasia without obvious evidence of invasive cancer.  MMR proteins were intact.     -CEA : elevated at 6.4.    -CT chest abdomen pelvis:  circumferential thickening sigmoid colon wall reflecting known possible malignancy. Multiple conspicuous inferior mesenteric lymph nodes concerning for metastatic disease.  Two indeterminate hepatic lesions measuring up to 1.1 cm.    -MRI liver:  simple cysts. No evidence of any metastatic disease.    -Surgical path (sigmoidectomy): 6.3 cm moderately differentiated adenocarcinoma of the sigmoid colon; invades muscularis propria into the pericolonic tissue.  No evidence of tumor perforation.  No LVI.  No perineural invasion.  All margins negative.  3/64 lymph nodes removed were positive for metastatic disease; 1 tumor deposit present.   -post-op CEA: <0.6    Treatment:  -8/24/2023: laparoscopic sigmoidectomy    -10/5/2023 -12/6/2023: adjuvant CapeOx x 4 cycles      Interval History   Shaylee Jackson is a 53 year old female with resected stage III colon cancer (low risk) who has finished 4 cycles of adjuvant chemotherapy with Xeloda and oxaliplatin is here for follow-up.  This is a video visit.    Did fairly well with chemotherapy.  Some expected side effects with taste alterations, core sensitivity and fatigue.  No significant peripheral neuropathy.  Still has some taste alterations.  No nausea or vomiting.  No diarrhea.  She has had regular menstrual bleeding and the last cycle was pretty heavy.  Feels a bit lightheaded and dizzy at times.  Had a repeat CT scan and labs last week.        ECOG Performance  0 - Independent    Physical Exam    General: alert and cooperative.      Lab Results    No results found for this  or any previous visit (from the past 168 hour(s)).        Imaging    CT Chest/Abdomen/Pelvis w Contrast    Result Date: 12/27/2023  EXAM: CT CHEST/ABDOMEN/PELVIS W CONTRAST LOCATION: [facility] DATE: 12/27/2023 INDICATION: resected colon cancer. Baseline surveillance CT COMPARISON: 08/10/2023 TECHNIQUE: CT scan of the chest, abdomen, and pelvis was performed following injection of IV contrast. Multiplanar reformats were obtained. Dose reduction techniques were used. CONTRAST: Isovue 370 86cc FINDINGS: LUNGS AND PLEURA: No pulmonary mass, consolidation, or suspicious pulmonary nodule. No pleural effusion or pneumothorax. MEDIASTINUM/AXILLAE: Great vessels normal in caliber. Cardiac chambers unremarkable. No pericardial effusion. No abnormally enlarged mediastinal, hilar, or axillary lymph nodes. Right IJ port terminates in the mid SVC. CORONARY ARTERY CALCIFICATION: None. HEPATOBILIARY: Stable left hepatic cyst measuring 1.1 cm. Stable calcified 1.8 cm gallstone. No suspicious liver lesion. PANCREAS: Normal. SPLEEN: Normal. ADRENAL GLANDS: Normal. KIDNEYS/BLADDER: Normal. BOWEL: Interval sigmoid resection and anastomosis. Large amount stool in the colon. No obstruction. No free air or free fluid. LYMPH NODES: Previously seen perirectal lymph nodes are no longer seen. No abnormally enlarged mesenteric or retroperitoneal lymph nodes. VASCULATURE: Unremarkable. PELVIC ORGANS: Several uterine fibroids, posterior fundal. MUSCULOSKELETAL: No suspicious bone lesion.     IMPRESSION: 1.  No evidence for recurrent or metastatic colon cancer. Interval sigmoidectomy and anastomosis.       Signed by: Tuan Cohn MD

## 2024-01-04 NOTE — NURSING NOTE
Is the patient currently in the state of [place]? YES    Visit mode:VIDEO    If the visit is dropped, the patient can be reconnected by: VIDEO VISIT: Text to cell phone:   Telephone Information:   Mobile [phone]       Will anyone else be joining the visit? NO  (If patient encounters technical issues they should call [phone])    How would you like to obtain your AVS? MyChart    Are changes needed to the allergy or medication list? No    Reason for visit: No chief complaint on file.    Matt STAPLETON

## 2024-01-04 NOTE — LETTER
1/4/2024         RE: Shaylee Jackson  [address] [place] [address]        Dear Colleague,    Thank you for referring your patient, Shaylee Jackson, to the [facility] CANCER [facility]. Please see a copy of my visit note below.    Virtual Visit Details    Type of service:  Video Visit   Video start time: 12:28 PM  Video stop time: 12:38 PM  Originating Location (pt. Location): Home    Distant Location (provider location):  Off-site  Platform used for Video Visit: [facility] Hematology and Oncology Progress Note    Patient: Shaylee Jackson  MRN: [id number]  [birth year]        Reason for Visit    Resected stage IIIb colon cancer, on adjuvant chemotherapy    Primary oncologist: Dr. Cohn    Assessment and Plan  Resected stage IIIB (pT3, pN1b, cM0) sigmoid colon cancer  MMR proteins intact  No high risk features except for tumor deposit (1)  Shaylee has now completed 4 cycles of adjuvant CapeOx.    Tolerated chemotherapy fairly well without any major side effects.  Had a repeat CT scan and labs last week.  Labs show cytopenia with a platelet count of 83,000.  Hemoglobin is low at 9.5.  Normal white count.  Serum chemistries mostly stable.  CEA was 0.9.  Reviewed CT scan images and report personally and independently interpreted the results.  No evidence of any metastatic disease.  Postsurgical changes noted.    Plan is to continue surveillance with periodic labs and physical exam.  Imaging once every 6 months for the first 2 years and then once a year.  I will see him back in 3 months with repeat labs.  CBC, CMP and CEA.      Iron deficiency anemia  Continues to have significant anemia.  Iron studies done recently showed a transferrin saturation of 15%.  I think she is still iron deficient.  She also has had significant menstrual bleeding.  Recommended oral iron either daily or every other day.  If she continues to have significant menstrual bleeding or becomes more  symptomatic then I think we should arrange some IV iron infusions.  She is agreeable to the plan.    Oncology history   Cancer Staging   Malignant neoplasm of sigmoid colon (H)  Staging form: Colon and Rectum, AJCC 8th Edition  - Pathologic: Stage IIIB (pT3, pN1b, cM0) - Signed by Tuan Cohn MD on 10/12/2023    8/2023: Stage IIIB (T3-tF2a-xK8) colon cancer (low risk)  -Screening colonoscopy: fungating polypoid ulcerated obstructing mass in the sigmoid colon located at 30 cm from the anal verge. Biopsy: high-grade dysplasia without obvious evidence of invasive cancer.  MMR proteins were intact.     -CEA : elevated at 6.4.    -CT chest abdomen pelvis:  circumferential thickening sigmoid colon wall reflecting known possible malignancy. Multiple conspicuous inferior mesenteric lymph nodes concerning for metastatic disease.  Two indeterminate hepatic lesions measuring up to 1.1 cm.    -MRI liver:  simple cysts. No evidence of any metastatic disease.    -Surgical path (sigmoidectomy): 6.3 cm moderately differentiated adenocarcinoma of the sigmoid colon; invades muscularis propria into the pericolonic tissue.  No evidence of tumor perforation.  No LVI.  No perineural invasion.  All margins negative.  3/64 lymph nodes removed were positive for metastatic disease; 1 tumor deposit present.   -post-op CEA: <0.6    Treatment:  -8/24/2023: laparoscopic sigmoidectomy    -10/5/2023 -12/6/2023: adjuvant CapeOx x 4 cycles      Interval History   Shaylee Jackson is a 53 year old female with resected stage III colon cancer (low risk) who has finished 4 cycles of adjuvant chemotherapy with Xeloda and oxaliplatin is here for follow-up.  This is a video visit.    Did fairly well with chemotherapy.  Some expected side effects with taste alterations, core sensitivity and fatigue.  No significant peripheral neuropathy.  Still has some taste alterations.  No nausea or vomiting.  No diarrhea.  She has had regular menstrual  bleeding and the last cycle was pretty heavy.  Feels a bit lightheaded and dizzy at times.  Had a repeat CT scan and labs last week.        ECOG Performance  0 - Independent    Physical Exam    General: alert and cooperative.      Lab Results    No results found for this or any previous visit (from the past 168 hour(s)).        Imaging    CT Chest/Abdomen/Pelvis w Contrast    Result Date: 12/27/2023  EXAM: CT CHEST/ABDOMEN/PELVIS W CONTRAST LOCATION: [facility] DATE: 12/27/2023 INDICATION: resected colon cancer. Baseline surveillance CT COMPARISON: 08/10/2023 TECHNIQUE: CT scan of the chest, abdomen, and pelvis was performed following injection of IV contrast. Multiplanar reformats were obtained. Dose reduction techniques were used. CONTRAST: Isovue 370 86cc FINDINGS: LUNGS AND PLEURA: No pulmonary mass, consolidation, or suspicious pulmonary nodule. No pleural effusion or pneumothorax. MEDIASTINUM/AXILLAE: Great vessels normal in caliber. Cardiac chambers unremarkable. No pericardial effusion. No abnormally enlarged mediastinal, hilar, or axillary lymph nodes. Right IJ port terminates in the mid SVC. CORONARY ARTERY CALCIFICATION: None. HEPATOBILIARY: Stable left hepatic cyst measuring 1.1 cm. Stable calcified 1.8 cm gallstone. No suspicious liver lesion. PANCREAS: Normal. SPLEEN: Normal. ADRENAL GLANDS: Normal. KIDNEYS/BLADDER: Normal. BOWEL: Interval sigmoid resection and anastomosis. Large amount stool in the colon. No obstruction. No free air or free fluid. LYMPH NODES: Previously seen perirectal lymph nodes are no longer seen. No abnormally enlarged mesenteric or retroperitoneal lymph nodes. VASCULATURE: Unremarkable. PELVIC ORGANS: Several uterine fibroids, posterior fundal. MUSCULOSKELETAL: No suspicious bone lesion.     IMPRESSION: 1.  No evidence for recurrent or metastatic colon cancer. Interval sigmoidectomy and anastomosis.       Signed by: Tuan Cohn MD      Again,  thank you for allowing me to participate in the care of your patient.        Sincerely,        Tuan Cohn MD

## 2024-01-04 NOTE — PROGRESS NOTES
Thank you for the opportunity to be a part in the care of this patient's oral chemotherapy. The oncology pharmacy will no longer be following this patient for oral chemotherapy. If there are any questions or the plan changes, feel free to contact us.    [facility]  Oncology Pharmacy Intern  [facility]  [phone]

## 2024-01-04 NOTE — LETTER
1/4/2024         RE: Shaylee Jackson  [address] [place] [address]        Dear Colleague,    Thank you for referring your patient, Shaylee Jackson, to the [facility]. Please see a copy of my visit note below.    Virtual Visit Details    Type of service:  Video Visit   Video start time: 12:28 PM  Video stop time: 12:38 PM  Originating Location (pt. Location): Home    Distant Location (provider location):  Off-site  Platform used for Video Visit: [facility] Progress Note    Patient: Shaylee Jackson  MRN: [id number]  1/04/24        Reason for Visit    Resected stage IIIb colon cancer, on adjuvant chemotherapy    Primary oncologist: Dr. Cohn    Assessment and Plan  Resected stage IIIB (pT3, pN1b, cM0) sigmoid colon cancer  MMR proteins intact  No high risk features except for tumor deposit (1)  Shaylee has now completed 4 cycles of adjuvant CapeOx.    Tolerated chemotherapy fairly well without any major side effects.  Had a repeat CT scan and labs last week.  Labs show cytopenia with a platelet count of 83,000.  Hemoglobin is low at 9.5.  Normal white count.  Serum chemistries mostly stable.  CEA was 0.9.  Reviewed CT scan images and report personally and independently interpreted the results.  No evidence of any metastatic disease.  Postsurgical changes noted.    Plan is to continue surveillance with periodic labs and physical exam.  Imaging once every 6 months for the first 2 years and then once a year.  I will see him back in 3 months with repeat labs.  CBC, CMP and CEA.      Iron deficiency anemia  Continues to have significant anemia.  Iron studies done recently showed a transferrin saturation of 15%.  I think she is still iron deficient.  She also has had significant menstrual bleeding.  Recommended oral iron either daily or every other day.  If she continues to have significant menstrual bleeding or becomes more  symptomatic then I think we should arrange some IV iron infusions.  She is agreeable to the plan.    Oncology history   Cancer Staging   Malignant neoplasm of sigmoid colon (H)  Staging form: Colon and Rectum, AJCC 8th Edition  - Pathologic: Stage IIIB (pT3, pN1b, cM0) - Signed by Tuan Cohn MD on 10/12/2023    8/2023: Stage IIIB (T3-[id number]) colon cancer (low risk)  -Screening colonoscopy: fungating polypoid ulcerated obstructing mass in the sigmoid colon located at 30 cm from the anal verge. Biopsy: high-grade dysplasia without obvious evidence of invasive cancer.  MMR proteins were intact.     -CEA : elevated at 6.4.    -CT chest abdomen pelvis:  circumferential thickening sigmoid colon wall reflecting known possible malignancy. Multiple conspicuous inferior mesenteric lymph nodes concerning for metastatic disease.  Two indeterminate hepatic lesions measuring up to 1.1 cm.    -MRI liver:  simple cysts. No evidence of any metastatic disease.    -Surgical path (sigmoidectomy): 6.3 cm moderately differentiated adenocarcinoma of the sigmoid colon; invades muscularis propria into the pericolonic tissue.  No evidence of tumor perforation.  No LVI.  No perineural invasion.  All margins negative.  3/64 lymph nodes removed were positive for metastatic disease; 1 tumor deposit present.   -post-op CEA: <0.6    Treatment:  -8/24/2023: laparoscopic sigmoidectomy    -10/5/2023 -12/6/2023: adjuvant CapeOx x 4 cycles      Interval History   Shaylee Jackson is a 53 year old female with resected stage III colon cancer (low risk) who has finished 4 cycles of adjuvant chemotherapy with Xeloda and oxaliplatin is here for follow-up.  This is a video visit.    Did fairly well with chemotherapy.  Some expected side effects with taste alterations, core sensitivity and fatigue.  No significant peripheral neuropathy.  Still has some taste alterations.  No nausea or vomiting.  No diarrhea.  She has had regular menstrual  bleeding and the last cycle was pretty heavy.  Feels a bit lightheaded and dizzy at times.  Had a repeat CT scan and labs last week.        ECOG Performance  0 - Independent    Physical Exam    General: alert and cooperative.      Lab Results    No results found for this or any previous visit (from the past 168 hour(s)).        Imaging    CT Chest/Abdomen/Pelvis w Contrast    Result Date: 12/27/2023  EXAM: CT CHEST/ABDOMEN/PELVIS W CONTRAST LOCATION: [facility] DATE: 12/27/2023 INDICATION: resected colon cancer. Baseline surveillance CT COMPARISON: 08/10/2023 TECHNIQUE: CT scan of the chest, abdomen, and pelvis was performed following injection of IV contrast. Multiplanar reformats were obtained. Dose reduction techniques were used. CONTRAST: Isovue 370 86cc FINDINGS: LUNGS AND PLEURA: No pulmonary mass, consolidation, or suspicious pulmonary nodule. No pleural effusion or pneumothorax. MEDIASTINUM/AXILLAE: Great vessels normal in caliber. Cardiac chambers unremarkable. No pericardial effusion. No abnormally enlarged mediastinal, hilar, or axillary lymph nodes. Right IJ port terminates in the mid SVC. CORONARY ARTERY CALCIFICATION: None. HEPATOBILIARY: Stable left hepatic cyst measuring 1.1 cm. Stable calcified 1.8 cm gallstone. No suspicious liver lesion. PANCREAS: Normal. SPLEEN: Normal. ADRENAL GLANDS: Normal. KIDNEYS/BLADDER: Normal. BOWEL: Interval sigmoid resection and anastomosis. Large amount stool in the colon. No obstruction. No free air or free fluid. LYMPH NODES: Previously seen perirectal lymph nodes are no longer seen. No abnormally enlarged mesenteric or retroperitoneal lymph nodes. VASCULATURE: Unremarkable. PELVIC ORGANS: Several uterine fibroids, posterior fundal. MUSCULOSKELETAL: No suspicious bone lesion.     IMPRESSION: 1.  No evidence for recurrent or metastatic colon cancer. Interval sigmoidectomy and anastomosis.       Signed by: Tuan Cohn MD      Again,  thank you for allowing me to participate in the care of your patient.        Sincerely,        Tuan Cohn MD

## 2024-02-08 ENCOUNTER — TELEPHONE (OUTPATIENT)
Dept: SURGERY | Facility: CLINIC | Age: 54
End: 2024-02-08
Payer: COMMERCIAL

## 2024-02-08 NOTE — TELEPHONE ENCOUNTER
On 2/8/2024 at 12:01 PM:  Patient called in, she is trying to schedule a Port Removal with Dr. Goetz at [facility]. I explained that this is the Colon and Rectal Surgery Department, so it is not the correct department.    Transferred patient to [facility] at [phone], and also provided patient with that phone number.    Pattie Lopes  [facility]anesa-op Coordinator  [facility]  Direct Phone: [phone]

## 2024-02-22 ENCOUNTER — OFFICE VISIT (OUTPATIENT)
Dept: SURGERY | Facility: CLINIC | Age: 54
End: 2024-02-22
Payer: COMMERCIAL

## 2024-02-22 VITALS
TEMPERATURE: 97.4 F | OXYGEN SATURATION: 98 % | HEIGHT: 66 IN | DIASTOLIC BLOOD PRESSURE: 74 MMHG | HEART RATE: 81 BPM | BODY MASS INDEX: 25.23 KG/M2 | SYSTOLIC BLOOD PRESSURE: 116 MMHG | WEIGHT: 157 LBS

## 2024-02-22 DIAGNOSIS — C18.7 MALIGNANT NEOPLASM OF SIGMOID COLON (H): Primary | ICD-10-CM

## 2024-02-22 PROCEDURE — 36590 REMOVAL TUNNELED CV CATH: CPT | Performed by: SURGERY

## 2024-02-22 ASSESSMENT — PAIN SCALES - GENERAL: PAINLEVEL: NO PAIN (0)

## 2024-02-22 NOTE — LETTER
2/22/2024         RE: Shaylee Jackson  [address] [facility] [address]        Dear Colleague,    Thank you for referring your patient, Shaylee Jackson, to the [facility]. Please see a copy of my visit note below.    Procedure Date: 02/22/24     PREOPERATIVE DIAGNOSIS: 1. Colon cancer  POSTOPERATIVE DIAGNOSIS: Same  PROCEDURE: Removal of right internal jugular infusion port    ATTENDING SURGEON: Lewis Neville DO    ESTIMATED BLOOD LOSS: 3 mL    INDICATIONS: Shaylee Jackson presents with a history of colon cancer who has now completed treatment. She presents for elective removal of her infusion port, and was counseled about the indications, risks, benefits and alternatives to surgery. She understood the information given, and wishes to proceed.     DESCRIPTION OF PROCEDURE: The patient was brought to the procedure room and placed supine on the operating room table. The right chest and neck were then prepped and draped in the usual sterile fashion. Local anesthetic was delivered at the right chest wall at the site of the infusion port reservoir, and an incision created over the existing scar. The tissue was dissected using a 15-blade scalpel, and two points of fixation each released sequentially, and all suture material removed. The port reservoir and catheter were then removed as a single unit, and the catheter inspected carefully, and appeared wholly intact and unremarkable. Hemostasis was assured at the surgical field/wound.  The subcutaneous tissue was closed with interrupted 3-0 Vicryl. Skin edges were re-approximated using 4-0 Monocryl, and the wound was cleansed and dried prior to application of sterile glue.    The patient tolerated the procedure well.    Lewis Neville DO on 2/22/2024 at 11:06 AM      Again, thank you for allowing me to participate in the care of your patient.        Sincerely,        Lewis Neville DO

## 2024-02-22 NOTE — PROGRESS NOTES
Procedure Date: 02/22/24     PREOPERATIVE DIAGNOSIS: 1. Colon cancer  POSTOPERATIVE DIAGNOSIS: Same  PROCEDURE: Removal of right internal jugular infusion port    ATTENDING SURGEON: Lewis Neville DO    ESTIMATED BLOOD LOSS: 3 mL    INDICATIONS: Shaylee Jackson presents with a history of colon cancer who has now completed treatment. She presents for elective removal of her infusion port, and was counseled about the indications, risks, benefits and alternatives to surgery. She understood the information given, and wishes to proceed.     DESCRIPTION OF PROCEDURE: The patient was brought to the procedure room and placed supine on the operating room table. The right chest and neck were then prepped and draped in the usual sterile fashion. Local anesthetic was delivered at the right chest wall at the site of the infusion port reservoir, and an incision created over the existing scar. The tissue was dissected using a 15-blade scalpel, and two points of fixation each released sequentially, and all suture material removed. The port reservoir and catheter were then removed as a single unit, and the catheter inspected carefully, and appeared wholly intact and unremarkable. Hemostasis was assured at the surgical field/wound.  The subcutaneous tissue was closed with interrupted 3-0 Vicryl. Skin edges were re-approximated using 4-0 Monocryl, and the wound was cleansed and dried prior to application of sterile glue.    The patient tolerated the procedure well.    Lewis Neville DO on 2/22/2024 at 11:06 AM

## 2024-06-24 ENCOUNTER — ANCILLARY ORDERS (OUTPATIENT)
Dept: CT IMAGING | Facility: CLINIC | Age: 54
End: 2024-06-24

## 2024-06-24 DIAGNOSIS — C18.7 CANCER OF SIGMOID COLON (H): Primary | ICD-10-CM

## 2024-07-03 ENCOUNTER — ANCILLARY PROCEDURE (OUTPATIENT)
Dept: CT IMAGING | Facility: CLINIC | Age: 54
End: 2024-07-03
Attending: INTERNAL MEDICINE
Payer: COMMERCIAL

## 2024-07-03 DIAGNOSIS — C18.7 CANCER OF SIGMOID COLON (H): ICD-10-CM

## 2024-07-03 PROCEDURE — 74177 CT ABD & PELVIS W/CONTRAST: CPT | Mod: GC | Performed by: RADIOLOGY

## 2024-07-03 PROCEDURE — 71260 CT THORAX DX C+: CPT | Mod: GC | Performed by: RADIOLOGY

## 2024-07-03 RX ORDER — IOPAMIDOL 755 MG/ML
86 INJECTION, SOLUTION INTRAVASCULAR ONCE
Status: COMPLETED | OUTPATIENT
Start: 2024-07-03 | End: 2024-07-03

## 2024-07-03 RX ADMIN — IOPAMIDOL 86 ML: 755 INJECTION, SOLUTION INTRAVASCULAR at 09:14

## 2024-10-07 ENCOUNTER — PATIENT OUTREACH (OUTPATIENT)
Dept: ONCOLOGY | Facility: CLINIC | Age: 54
End: 2024-10-07
Payer: COMMERCIAL

## 2024-10-07 NOTE — PROGRESS NOTES
[facility]: Cancer Care                                                                                          Pt transferred care. Removed SCAR Newton from pt care team.    Signature:  CHARLES KHAN RN

## 2024-11-12 ENCOUNTER — ANCILLARY ORDERS (OUTPATIENT)
Dept: CT IMAGING | Facility: CLINIC | Age: 54
End: 2024-11-12

## 2024-11-12 DIAGNOSIS — C18.7 CANCER OF SIGMOID (H): Primary | ICD-10-CM

## 2025-01-05 ENCOUNTER — HEALTH MAINTENANCE LETTER (OUTPATIENT)
Age: 55
End: 2025-01-05

## 2025-07-02 ENCOUNTER — ANCILLARY PROCEDURE (OUTPATIENT)
Dept: CT IMAGING | Facility: CLINIC | Age: 55
End: 2025-07-02
Attending: INTERNAL MEDICINE
Payer: COMMERCIAL

## 2025-07-02 DIAGNOSIS — C18.7 MALIGNANT NEOPLASM OF SIGMOID COLON (H): ICD-10-CM

## 2025-07-02 PROCEDURE — 71260 CT THORAX DX C+: CPT | Mod: GC | Performed by: STUDENT IN AN ORGANIZED HEALTH CARE EDUCATION/TRAINING PROGRAM

## 2025-07-02 PROCEDURE — 74177 CT ABD & PELVIS W/CONTRAST: CPT | Mod: GC | Performed by: STUDENT IN AN ORGANIZED HEALTH CARE EDUCATION/TRAINING PROGRAM

## 2025-07-02 RX ORDER — IOPAMIDOL 755 MG/ML
86 INJECTION, SOLUTION INTRAVASCULAR ONCE
Status: COMPLETED | OUTPATIENT
Start: 2025-07-02 | End: 2025-07-02

## 2025-07-02 RX ADMIN — IOPAMIDOL 86 ML: 755 INJECTION, SOLUTION INTRAVASCULAR at 10:00

## (undated) DEVICE — PACK LAP TRANSVERSE STD

## (undated) DEVICE — SU VICRYL 3-0 SH 27" UND J416H

## (undated) DEVICE — DECANTER BAG 2002S

## (undated) DEVICE — DECANTER VIAL 2006S

## (undated) DEVICE — STOCKING SLEEVE COMPRESSION CALF LG

## (undated) DEVICE — GLOVE BIOGEL PI MICRO INDICATOR UNDERGLOVE SZ 8.0 48980

## (undated) DEVICE — BLADE KNIFE SURG 15 371115

## (undated) DEVICE — BLADE KNIFE SURG 11 371111

## (undated) DEVICE — SU ETHIBOND 2-0 MO-7 CR 8X18" CX42D

## (undated) DEVICE — SYR 10ML FINGER CONTROL W/O NDL 309695

## (undated) DEVICE — SU DERMABOND ADVANCED .7ML DNX12

## (undated) DEVICE — SU MONOCRYL 4-0 PS-2 18" UND Y496G

## (undated) DEVICE — SYR 10ML LL W/O NDL

## (undated) DEVICE — DRAPE C-ARM 60X42" 1013

## (undated) DEVICE — NEEDLE HYPO 23GA 1.5IN BD REG BVL STRL 305194

## (undated) DEVICE — COVER NEOPROBE SOFTFLEX 5X96" W/BANDS 20-PC596

## (undated) DEVICE — GLOVE BIOGEL PI MICRO SZ 7.5 48575

## (undated) DEVICE — PREP CHLORAPREP 26ML TINTED ORANGE  260815

## (undated) DEVICE — NDL COUNTER 20CT 31142493

## (undated) DEVICE — SOL NACL 0.9% IRRIG 1000ML BOTTLE 07138-09

## (undated) DEVICE — SUCTION MANIFOLD NEPTUNE 2 SYS 1 PORT 702-025-000

## (undated) DEVICE — DRAPE SHEET REV FOLD 3/4 9349

## (undated) DEVICE — SOL WATER IRRIG 1000ML BOTTLE 07139-09

## (undated) DEVICE — SPONGE RAY-TEC 4X8" 7318

## (undated) DEVICE — LIGHT HANDLE X2

## (undated) DEVICE — LABEL MEDICATION SYSTEM  3304

## (undated) DEVICE — GOWN XLG DISP 9545

## (undated) DEVICE — BASIN SET MINOR DISP

## (undated) RX ORDER — BUPIVACAINE HYDROCHLORIDE 5 MG/ML
INJECTION, SOLUTION EPIDURAL; INTRACAUDAL
Status: DISPENSED
Start: 2023-10-04

## (undated) RX ORDER — ACETAMINOPHEN 325 MG/1
TABLET ORAL
Status: DISPENSED
Start: 2023-10-04

## (undated) RX ORDER — LIDOCAINE HYDROCHLORIDE AND EPINEPHRINE 10; 10 MG/ML; UG/ML
INJECTION, SOLUTION INFILTRATION; PERINEURAL
Status: DISPENSED
Start: 2023-10-04

## (undated) RX ORDER — PROPOFOL 10 MG/ML
INJECTION, EMULSION INTRAVENOUS
Status: DISPENSED
Start: 2023-10-04